# Patient Record
Sex: MALE | Race: WHITE | NOT HISPANIC OR LATINO
[De-identification: names, ages, dates, MRNs, and addresses within clinical notes are randomized per-mention and may not be internally consistent; named-entity substitution may affect disease eponyms.]

---

## 2017-02-21 ENCOUNTER — MEDICATION RENEWAL (OUTPATIENT)
Age: 27
End: 2017-02-21

## 2017-02-21 ENCOUNTER — RX RENEWAL (OUTPATIENT)
Age: 27
End: 2017-02-21

## 2017-03-10 ENCOUNTER — APPOINTMENT (OUTPATIENT)
Dept: INFECTIOUS DISEASE | Facility: CLINIC | Age: 27
End: 2017-03-10

## 2017-03-15 ENCOUNTER — LABORATORY RESULT (OUTPATIENT)
Age: 27
End: 2017-03-15

## 2017-03-15 ENCOUNTER — APPOINTMENT (OUTPATIENT)
Dept: INFECTIOUS DISEASE | Facility: CLINIC | Age: 27
End: 2017-03-15

## 2017-03-17 LAB
ALBUMIN SERPL ELPH-MCNC: 4.4 G/DL
ALP BLD-CCNC: 69 U/L
ALT SERPL-CCNC: 34 U/L
ANION GAP SERPL CALC-SCNC: 16 MMOL/L
AST SERPL-CCNC: 24 U/L
BASOPHILS # BLD AUTO: 0.04 K/UL
BASOPHILS NFR BLD AUTO: 0.5 %
BILIRUB SERPL-MCNC: 0.5 MG/DL
BUN SERPL-MCNC: 12 MG/DL
C TRACH RRNA SPEC QL NAA+PROBE: NORMAL
CALCIUM SERPL-MCNC: 9.6 MG/DL
CHLORIDE SERPL-SCNC: 100 MMOL/L
CO2 SERPL-SCNC: 25 MMOL/L
CREAT SERPL-MCNC: 1.13 MG/DL
EOSINOPHIL # BLD AUTO: 0.04 K/UL
EOSINOPHIL NFR BLD AUTO: 0.5 %
GLUCOSE SERPL-MCNC: 78 MG/DL
HCT VFR BLD CALC: 47.7 %
HGB BLD-MCNC: 15.7 G/DL
HIV1 RNA # SERPL NAA+PROBE: NOT DETECTED COPIES/ML
IMM GRANULOCYTES NFR BLD AUTO: 0.5 %
LYMPHOCYTES # BLD AUTO: 1.83 K/UL
LYMPHOCYTES NFR BLD AUTO: 21.6 %
MAN DIFF?: NORMAL
MCHC RBC-ENTMCNC: 27.8 PG
MCHC RBC-ENTMCNC: 32.9 GM/DL
MCV RBC AUTO: 84.4 FL
MONOCYTES # BLD AUTO: 1.07 K/UL
MONOCYTES NFR BLD AUTO: 12.6 %
N GONORRHOEA RRNA SPEC QL NAA+PROBE: NORMAL
NEUTROPHILS # BLD AUTO: 5.46 K/UL
NEUTROPHILS NFR BLD AUTO: 64.3 %
PLATELET # BLD AUTO: 239 K/UL
POTASSIUM SERPL-SCNC: 4.3 MMOL/L
PROT SERPL-MCNC: 7.7 G/DL
RBC # BLD: 5.65 M/UL
RBC # FLD: 13.2 %
SODIUM SERPL-SCNC: 141 MMOL/L
SOURCE AMPLIFICATION: NORMAL
T PALLIDUM AB SER QL IA: POSITIVE
VIRAL LOAD LOG: NOT DETECTED LG10COP/ML
WBC # FLD AUTO: 8.48 K/UL

## 2017-03-31 ENCOUNTER — APPOINTMENT (OUTPATIENT)
Dept: INFECTIOUS DISEASE | Facility: CLINIC | Age: 27
End: 2017-03-31

## 2017-03-31 VITALS
DIASTOLIC BLOOD PRESSURE: 76 MMHG | WEIGHT: 216 LBS | HEART RATE: 73 BPM | SYSTOLIC BLOOD PRESSURE: 134 MMHG | OXYGEN SATURATION: 97 % | HEIGHT: 71 IN | TEMPERATURE: 97 F | BODY MASS INDEX: 30.24 KG/M2

## 2017-03-31 RX ORDER — ERYTHROMYCIN AND BENZOYL PEROXIDE 3 %-5 %
5-3 KIT TOPICAL
Qty: 466 | Refills: 0 | Status: ACTIVE | COMMUNITY
Start: 2016-12-15

## 2017-04-06 LAB
C TRACH RRNA SPEC QL NAA+PROBE: NORMAL
C TRACH RRNA SPEC QL NAA+PROBE: NORMAL
HIV1 RNA # SERPL NAA+PROBE: NOT DETECTED COPIES/ML
HIV1+2 AB SPEC QL IA.RAPID: NONREACTIVE
N GONORRHOEA RRNA SPEC QL NAA+PROBE: NORMAL
N GONORRHOEA RRNA SPEC QL NAA+PROBE: NORMAL
RPR SER-TITR: NORMAL
SOURCE AMPLIFICATION: NORMAL
SOURCE AMPLIFICATION: NORMAL
VIRAL LOAD LOG: NOT DETECTED LG10COP/ML

## 2017-06-08 ENCOUNTER — MEDICATION RENEWAL (OUTPATIENT)
Age: 27
End: 2017-06-08

## 2017-06-08 ENCOUNTER — RX RENEWAL (OUTPATIENT)
Age: 27
End: 2017-06-08

## 2017-07-10 ENCOUNTER — APPOINTMENT (OUTPATIENT)
Dept: INFECTIOUS DISEASE | Facility: CLINIC | Age: 27
End: 2017-07-10

## 2017-07-26 ENCOUNTER — RX RENEWAL (OUTPATIENT)
Age: 27
End: 2017-07-26

## 2017-07-26 PROBLEM — Z00.00 ENCOUNTER FOR PREVENTIVE HEALTH EXAMINATION: Noted: 2017-07-26

## 2017-07-31 ENCOUNTER — LABORATORY RESULT (OUTPATIENT)
Age: 27
End: 2017-07-31

## 2017-07-31 ENCOUNTER — APPOINTMENT (OUTPATIENT)
Dept: INFECTIOUS DISEASE | Facility: CLINIC | Age: 27
End: 2017-07-31

## 2017-08-03 ENCOUNTER — APPOINTMENT (OUTPATIENT)
Dept: INFECTIOUS DISEASE | Facility: CLINIC | Age: 27
End: 2017-08-03

## 2017-08-08 LAB
ALBUMIN SERPL ELPH-MCNC: 4.8 G/DL
ALP BLD-CCNC: 83 U/L
ALT SERPL-CCNC: 40 U/L
ANION GAP SERPL CALC-SCNC: 14 MMOL/L
AST SERPL-CCNC: 22 U/L
BASOPHILS # BLD AUTO: 0.03 K/UL
BASOPHILS NFR BLD AUTO: 0.4 %
BILIRUB SERPL-MCNC: 0.5 MG/DL
BUN SERPL-MCNC: 11 MG/DL
C TRACH RRNA SPEC QL NAA+PROBE: NORMAL
C TRACH RRNA SPEC QL NAA+PROBE: NORMAL
CALCIUM SERPL-MCNC: 9.8 MG/DL
CHLORIDE SERPL-SCNC: 97 MMOL/L
CO2 SERPL-SCNC: 28 MMOL/L
CREAT SERPL-MCNC: 1.01 MG/DL
EOSINOPHIL # BLD AUTO: 0.05 K/UL
EOSINOPHIL NFR BLD AUTO: 0.6 %
GLUCOSE SERPL-MCNC: 94 MG/DL
HCT VFR BLD CALC: 46.4 %
HGB BLD-MCNC: 15.5 G/DL
HIV1+2 AB SPEC QL IA.RAPID: NONREACTIVE
IMM GRANULOCYTES NFR BLD AUTO: 0.2 %
LYMPHOCYTES # BLD AUTO: 1.6 K/UL
LYMPHOCYTES NFR BLD AUTO: 19.2 %
MAN DIFF?: NORMAL
MCHC RBC-ENTMCNC: 27.4 PG
MCHC RBC-ENTMCNC: 33.4 GM/DL
MCV RBC AUTO: 82.1 FL
MONOCYTES # BLD AUTO: 0.91 K/UL
MONOCYTES NFR BLD AUTO: 10.9 %
N GONORRHOEA RRNA SPEC QL NAA+PROBE: NORMAL
N GONORRHOEA RRNA SPEC QL NAA+PROBE: NORMAL
NEUTROPHILS # BLD AUTO: 5.71 K/UL
NEUTROPHILS NFR BLD AUTO: 68.7 %
PLATELET # BLD AUTO: 262 K/UL
POTASSIUM SERPL-SCNC: 5 MMOL/L
PROT SERPL-MCNC: 7.4 G/DL
RBC # BLD: 5.65 M/UL
RBC # FLD: 13.5 %
SODIUM SERPL-SCNC: 139 MMOL/L
SOURCE AMPLIFICATION: NORMAL
SOURCE AMPLIFICATION: NORMAL
T PALLIDUM AB SER QL IA: POSITIVE
WBC # FLD AUTO: 8.32 K/UL

## 2017-08-09 ENCOUNTER — APPOINTMENT (OUTPATIENT)
Dept: INFECTIOUS DISEASE | Facility: CLINIC | Age: 27
End: 2017-08-09
Payer: COMMERCIAL

## 2017-08-09 ENCOUNTER — APPOINTMENT (OUTPATIENT)
Dept: INFECTIOUS DISEASE | Facility: CLINIC | Age: 27
End: 2017-08-09

## 2017-08-09 VITALS
HEART RATE: 75 BPM | RESPIRATION RATE: 16 BRPM | WEIGHT: 215 LBS | SYSTOLIC BLOOD PRESSURE: 139 MMHG | TEMPERATURE: 97.7 F | OXYGEN SATURATION: 98 % | DIASTOLIC BLOOD PRESSURE: 78 MMHG | BODY MASS INDEX: 30.1 KG/M2 | HEIGHT: 71 IN

## 2017-08-09 PROCEDURE — 90651 9VHPV VACCINE 2/3 DOSE IM: CPT

## 2017-08-09 PROCEDURE — 99213 OFFICE O/P EST LOW 20 MIN: CPT | Mod: 25

## 2017-08-09 PROCEDURE — 90471 IMMUNIZATION ADMIN: CPT

## 2017-08-09 RX ORDER — FEXOFENADINE HYDROCHLORIDE 30 MG/1
30 TABLET, ORALLY DISINTEGRATING ORAL
Refills: 0 | Status: ACTIVE | COMMUNITY
Start: 2017-08-09

## 2017-08-09 RX ORDER — ASCORBIC ACID 1000 MG
1000 TABLET, EXTENDED RELEASE ORAL
Refills: 0 | Status: ACTIVE | COMMUNITY
Start: 2017-08-09

## 2017-08-10 ENCOUNTER — MED ADMIN CHARGE (OUTPATIENT)
Age: 27
End: 2017-08-10

## 2017-08-28 ENCOUNTER — RX RENEWAL (OUTPATIENT)
Age: 27
End: 2017-08-28

## 2017-10-04 ENCOUNTER — MEDICATION RENEWAL (OUTPATIENT)
Age: 27
End: 2017-10-04

## 2017-10-04 ENCOUNTER — RX RENEWAL (OUTPATIENT)
Age: 27
End: 2017-10-04

## 2017-10-16 ENCOUNTER — MEDICATION RENEWAL (OUTPATIENT)
Age: 27
End: 2017-10-16

## 2017-10-16 ENCOUNTER — MED ADMIN CHARGE (OUTPATIENT)
Age: 27
End: 2017-10-16

## 2017-10-16 ENCOUNTER — APPOINTMENT (OUTPATIENT)
Dept: INFECTIOUS DISEASE | Facility: CLINIC | Age: 27
End: 2017-10-16
Payer: COMMERCIAL

## 2017-10-16 PROCEDURE — 90651 9VHPV VACCINE 2/3 DOSE IM: CPT

## 2017-10-16 PROCEDURE — 90471 IMMUNIZATION ADMIN: CPT

## 2017-10-17 ENCOUNTER — MED ADMIN CHARGE (OUTPATIENT)
Age: 27
End: 2017-10-17

## 2017-10-27 ENCOUNTER — LABORATORY RESULT (OUTPATIENT)
Age: 27
End: 2017-10-27

## 2017-10-27 ENCOUNTER — APPOINTMENT (OUTPATIENT)
Dept: INFECTIOUS DISEASE | Facility: CLINIC | Age: 27
End: 2017-10-27
Payer: COMMERCIAL

## 2017-10-27 ENCOUNTER — RX RENEWAL (OUTPATIENT)
Age: 27
End: 2017-10-27

## 2017-10-27 VITALS
OXYGEN SATURATION: 98 % | HEIGHT: 71 IN | TEMPERATURE: 97 F | HEART RATE: 64 BPM | SYSTOLIC BLOOD PRESSURE: 134 MMHG | DIASTOLIC BLOOD PRESSURE: 77 MMHG | BODY MASS INDEX: 29.96 KG/M2 | WEIGHT: 214 LBS

## 2017-10-27 PROCEDURE — 99214 OFFICE O/P EST MOD 30 MIN: CPT

## 2017-10-27 RX ORDER — BIOTIN 10 MG
500 TABLET ORAL
Refills: 0 | Status: DISCONTINUED | COMMUNITY
Start: 2017-08-09 | End: 2017-10-27

## 2017-10-27 RX ORDER — ZINC 25 MG
25 TABLET ORAL
Refills: 0 | Status: ACTIVE | COMMUNITY
Start: 2017-10-27

## 2017-10-27 RX ORDER — HYDROCORTISONE 25 MG/G
2.5 OINTMENT TOPICAL
Qty: 28 | Refills: 0 | Status: ACTIVE | COMMUNITY
Start: 2017-10-19

## 2017-10-27 RX ORDER — MULTIVIT-MIN/IRON/FOLIC ACID/K 18-600-40
500 CAPSULE ORAL
Refills: 0 | Status: ACTIVE | COMMUNITY
Start: 2017-10-27

## 2017-10-30 ENCOUNTER — RESULT CHARGE (OUTPATIENT)
Age: 27
End: 2017-10-30

## 2017-10-30 ENCOUNTER — CHART COPY (OUTPATIENT)
Age: 27
End: 2017-10-30

## 2017-10-30 LAB
ALBUMIN SERPL ELPH-MCNC: 4.2 G/DL
ALP BLD-CCNC: 69 U/L
ALT SERPL-CCNC: 37 U/L
ANION GAP SERPL CALC-SCNC: 10 MMOL/L
APPEARANCE: CLEAR
AST SERPL-CCNC: 25 U/L
BACTERIA UR CULT: NORMAL
BACTERIA: NEGATIVE
BILIRUB SERPL-MCNC: 0.3 MG/DL
BILIRUBIN URINE: NEGATIVE
BLOOD URINE: NEGATIVE
BUN SERPL-MCNC: 10 MG/DL
C TRACH RRNA SPEC QL NAA+PROBE: NOT DETECTED
CALCIUM SERPL-MCNC: 9.6 MG/DL
CHLORIDE SERPL-SCNC: 101 MMOL/L
CO2 SERPL-SCNC: 27 MMOL/L
COLOR: ABNORMAL
CREAT SERPL-MCNC: 1.05 MG/DL
GLUCOSE QUALITATIVE U: NEGATIVE MG/DL
GLUCOSE SERPL-MCNC: 108 MG/DL
HAV IGG+IGM SER QL: NONREACTIVE
HBV CORE IGG+IGM SER QL: NONREACTIVE
HBV SURFACE AB SER QL: REACTIVE
HBV SURFACE AG SER QL: NONREACTIVE
HCV AB SER QL: NONREACTIVE
HCV S/CO RATIO: 0.13 S/CO
HIV1+2 AB SPEC QL IA.RAPID: NONREACTIVE
KETONES URINE: NEGATIVE
LEUKOCYTE ESTERASE URINE: ABNORMAL
MICROSCOPIC-UA: NORMAL
N GONORRHOEA RRNA SPEC QL NAA+PROBE: DETECTED
NITRITE URINE: POSITIVE
PH URINE: 6
POTASSIUM SERPL-SCNC: 5 MMOL/L
PROT SERPL-MCNC: 7.2 G/DL
PROTEIN URINE: NEGATIVE MG/DL
RED BLOOD CELLS URINE: 3 /HPF
SODIUM SERPL-SCNC: 138 MMOL/L
SOURCE AMPLIFICATION: NORMAL
SPECIFIC GRAVITY URINE: 1.01
SQUAMOUS EPITHELIAL CELLS: 0 /HPF
UROBILINOGEN URINE: 1 MG/DL
WHITE BLOOD CELLS URINE: 42 /HPF

## 2017-10-31 ENCOUNTER — APPOINTMENT (OUTPATIENT)
Dept: INFECTIOUS DISEASE | Facility: CLINIC | Age: 27
End: 2017-10-31
Payer: COMMERCIAL

## 2017-10-31 PROCEDURE — 96372 THER/PROPH/DIAG INJ SC/IM: CPT

## 2017-10-31 RX ORDER — CEFTRIAXONE 250 MG/1
250 INJECTION, POWDER, FOR SOLUTION INTRAMUSCULAR; INTRAVENOUS
Qty: 1 | Refills: 0 | Status: COMPLETED | OUTPATIENT
Start: 2017-10-30 | End: 2017-10-31

## 2017-11-01 LAB — T PALLIDUM AB SER QL IA: POSITIVE

## 2017-11-15 ENCOUNTER — RX RENEWAL (OUTPATIENT)
Age: 27
End: 2017-11-15

## 2017-11-15 ENCOUNTER — MEDICATION RENEWAL (OUTPATIENT)
Age: 27
End: 2017-11-15

## 2017-12-04 ENCOUNTER — MEDICATION RENEWAL (OUTPATIENT)
Age: 27
End: 2017-12-04

## 2018-01-29 ENCOUNTER — LABORATORY RESULT (OUTPATIENT)
Age: 28
End: 2018-01-29

## 2018-01-29 ENCOUNTER — APPOINTMENT (OUTPATIENT)
Dept: INFECTIOUS DISEASE | Facility: CLINIC | Age: 28
End: 2018-01-29
Payer: MEDICAID

## 2018-01-29 VITALS
HEART RATE: 60 BPM | BODY MASS INDEX: 31.36 KG/M2 | TEMPERATURE: 98 F | SYSTOLIC BLOOD PRESSURE: 133 MMHG | WEIGHT: 224 LBS | DIASTOLIC BLOOD PRESSURE: 80 MMHG | HEIGHT: 71 IN | OXYGEN SATURATION: 100 %

## 2018-01-29 DIAGNOSIS — A54.9 GONOCOCCAL INFECTION, UNSPECIFIED: ICD-10-CM

## 2018-01-29 PROCEDURE — 90651 9VHPV VACCINE 2/3 DOSE IM: CPT

## 2018-01-29 PROCEDURE — 99214 OFFICE O/P EST MOD 30 MIN: CPT | Mod: 25

## 2018-01-29 PROCEDURE — 90471 IMMUNIZATION ADMIN: CPT

## 2018-01-29 PROCEDURE — 90472 IMMUNIZATION ADMIN EACH ADD: CPT

## 2018-01-29 PROCEDURE — 90632 HEPA VACCINE ADULT IM: CPT

## 2018-01-29 RX ORDER — AZITHROMYCIN 500 MG/1
500 TABLET, FILM COATED ORAL
Qty: 2 | Refills: 0 | Status: DISCONTINUED | COMMUNITY
Start: 2017-10-30 | End: 2018-01-29

## 2018-01-29 RX ORDER — CRISABOROLE 20 MG/G
2 OINTMENT TOPICAL
Qty: 60 | Refills: 0 | Status: DISCONTINUED | COMMUNITY
Start: 2017-10-19 | End: 2018-01-29

## 2018-01-30 ENCOUNTER — MED ADMIN CHARGE (OUTPATIENT)
Age: 28
End: 2018-01-30

## 2018-01-30 PROBLEM — A54.9 GONORRHEA: Status: ACTIVE | Noted: 2017-10-30

## 2018-02-01 LAB
ALBUMIN SERPL ELPH-MCNC: 4.6 G/DL
ALP BLD-CCNC: 62 U/L
ALT SERPL-CCNC: 36 U/L
ANION GAP SERPL CALC-SCNC: 14 MMOL/L
AST SERPL-CCNC: 20 U/L
BASOPHILS # BLD AUTO: 0.04 K/UL
BASOPHILS NFR BLD AUTO: 0.6 %
BILIRUB SERPL-MCNC: 0.5 MG/DL
BUN SERPL-MCNC: 14 MG/DL
C TRACH RRNA SPEC QL NAA+PROBE: NOT DETECTED
C TRACH RRNA SPEC QL NAA+PROBE: NOT DETECTED
CALCIUM SERPL-MCNC: 9.6 MG/DL
CHLORIDE SERPL-SCNC: 103 MMOL/L
CMV IGG SERPL QL: 4.8 U/ML
CMV IGG SERPL-IMP: POSITIVE
CMV IGM SERPL QL: <8 AU/ML
CMV IGM SERPL QL: NEGATIVE
CO2 SERPL-SCNC: 24 MMOL/L
CREAT SERPL-MCNC: 0.89 MG/DL
EBV EA AB SER IA-ACNC: 11.1 U/ML
EBV EA AB TITR SER IF: POSITIVE
EBV EA IGG SER QL IA: 502 U/ML
EBV EA IGG SER-ACNC: POSITIVE
EBV EA IGM SER IA-ACNC: NEGATIVE
EBV PATRN SPEC IB-IMP: NORMAL
EBV VCA IGG SER IA-ACNC: 116 U/ML
EBV VCA IGM SER QL IA: <10 U/ML
EOSINOPHIL # BLD AUTO: 0.1 K/UL
EOSINOPHIL NFR BLD AUTO: 1.5 %
EPSTEIN-BARR VIRUS CAPSID ANTIGEN IGG: POSITIVE
GLUCOSE SERPL-MCNC: 96 MG/DL
HCT VFR BLD CALC: 47.1 %
HGB BLD-MCNC: 15.6 G/DL
HIV1+2 AB SPEC QL IA.RAPID: NONREACTIVE
HIVABINT: NORMAL
IMM GRANULOCYTES NFR BLD AUTO: 0.3 %
LYMPHOCYTES # BLD AUTO: 2.04 K/UL
LYMPHOCYTES NFR BLD AUTO: 30.5 %
MAN DIFF?: NORMAL
MCHC RBC-ENTMCNC: 28.2 PG
MCHC RBC-ENTMCNC: 33.1 GM/DL
MCV RBC AUTO: 85 FL
MONOCYTES # BLD AUTO: 0.78 K/UL
MONOCYTES NFR BLD AUTO: 11.7 %
N GONORRHOEA RRNA SPEC QL NAA+PROBE: NOT DETECTED
N GONORRHOEA RRNA SPEC QL NAA+PROBE: NOT DETECTED
NEUTROPHILS # BLD AUTO: 3.71 K/UL
NEUTROPHILS NFR BLD AUTO: 55.4 %
PLATELET # BLD AUTO: 226 K/UL
POTASSIUM SERPL-SCNC: 5 MMOL/L
PROT SERPL-MCNC: 7.2 G/DL
RBC # BLD: 5.54 M/UL
RBC # FLD: 13.1 %
SODIUM SERPL-SCNC: 141 MMOL/L
SOURCE AMPLIFICATION: NORMAL
SOURCE AMPLIFICATION: NORMAL
T PALLIDUM AB SER QL IA: POSITIVE
WBC # FLD AUTO: 6.69 K/UL

## 2018-03-26 ENCOUNTER — RX RENEWAL (OUTPATIENT)
Age: 28
End: 2018-03-26

## 2018-03-26 ENCOUNTER — MEDICATION RENEWAL (OUTPATIENT)
Age: 28
End: 2018-03-26

## 2018-05-25 ENCOUNTER — RX RENEWAL (OUTPATIENT)
Age: 28
End: 2018-05-25

## 2018-06-19 ENCOUNTER — RX RENEWAL (OUTPATIENT)
Age: 28
End: 2018-06-19

## 2018-09-13 ENCOUNTER — OUTPATIENT (OUTPATIENT)
Dept: OUTPATIENT SERVICES | Facility: HOSPITAL | Age: 28
LOS: 1 days | End: 2018-09-13
Payer: MEDICAID

## 2018-09-13 ENCOUNTER — APPOINTMENT (OUTPATIENT)
Dept: INFECTIOUS DISEASE | Facility: CLINIC | Age: 28
End: 2018-09-13
Payer: MEDICAID

## 2018-09-13 VITALS
RESPIRATION RATE: 16 BRPM | DIASTOLIC BLOOD PRESSURE: 68 MMHG | OXYGEN SATURATION: 98 % | WEIGHT: 233 LBS | HEART RATE: 81 BPM | TEMPERATURE: 97.1 F | SYSTOLIC BLOOD PRESSURE: 128 MMHG | BODY MASS INDEX: 32.5 KG/M2

## 2018-09-13 DIAGNOSIS — B97.89 OTHER VIRAL AGENTS AS THE CAUSE OF DISEASES CLASSIFIED ELSEWHERE: ICD-10-CM

## 2018-09-13 PROCEDURE — 99214 OFFICE O/P EST MOD 30 MIN: CPT

## 2018-09-13 PROCEDURE — 90632 HEPA VACCINE ADULT IM: CPT

## 2018-09-13 PROCEDURE — G0463: CPT | Mod: 25

## 2018-09-13 PROCEDURE — 90471 IMMUNIZATION ADMIN: CPT

## 2018-09-15 LAB
C TRACH RRNA SPEC QL NAA+PROBE: NOT DETECTED
HCV AB SER QL: NONREACTIVE
HCV S/CO RATIO: 0.14 S/CO
HIV1+2 AB SPEC QL IA.RAPID: NONREACTIVE
HSV 1+2 IGG SER IA-IMP: NEGATIVE
HSV 1+2 IGG SER IA-IMP: POSITIVE
HSV1 IGG SER QL: 29.6 INDEX
HSV2 IGG SER QL: 0.11 INDEX
N GONORRHOEA RRNA SPEC QL NAA+PROBE: NOT DETECTED
RPR SER-TITR: NORMAL
SOURCE AMPLIFICATION: NORMAL
SOURCE ANAL: NORMAL
SOURCE ORAL: NORMAL

## 2018-09-25 DIAGNOSIS — Z23 ENCOUNTER FOR IMMUNIZATION: ICD-10-CM

## 2018-09-25 DIAGNOSIS — Z11.3 ENCOUNTER FOR SCREENING FOR INFECTIONS WITH A PREDOMINANTLY SEXUAL MODE OF TRANSMISSION: ICD-10-CM

## 2018-09-25 DIAGNOSIS — Z20.820 CONTACT WITH AND (SUSPECTED) EXPOSURE TO VARICELLA: ICD-10-CM

## 2020-11-24 DIAGNOSIS — Z11.59 ENCOUNTER FOR SCREENING FOR OTHER VIRAL DISEASES: ICD-10-CM

## 2021-07-09 ENCOUNTER — OUTPATIENT (OUTPATIENT)
Dept: OUTPATIENT SERVICES | Facility: HOSPITAL | Age: 31
LOS: 1 days | End: 2021-07-09
Payer: MEDICAID

## 2021-07-09 ENCOUNTER — APPOINTMENT (OUTPATIENT)
Dept: INFECTIOUS DISEASE | Facility: CLINIC | Age: 31
End: 2021-07-09
Payer: MEDICAID

## 2021-07-09 ENCOUNTER — LABORATORY RESULT (OUTPATIENT)
Age: 31
End: 2021-07-09

## 2021-07-09 VITALS
HEART RATE: 77 BPM | HEIGHT: 71 IN | OXYGEN SATURATION: 96 % | WEIGHT: 270 LBS | SYSTOLIC BLOOD PRESSURE: 137 MMHG | TEMPERATURE: 98.9 F | BODY MASS INDEX: 37.8 KG/M2 | DIASTOLIC BLOOD PRESSURE: 83 MMHG

## 2021-07-09 DIAGNOSIS — E66.9 OBESITY, UNSPECIFIED: ICD-10-CM

## 2021-07-09 DIAGNOSIS — B97.89 OTHER VIRAL AGENTS AS THE CAUSE OF DISEASES CLASSIFIED ELSEWHERE: ICD-10-CM

## 2021-07-09 PROCEDURE — 99214 OFFICE O/P EST MOD 30 MIN: CPT

## 2021-07-09 PROCEDURE — 99204 OFFICE O/P NEW MOD 45 MIN: CPT

## 2021-07-09 PROCEDURE — G0463: CPT

## 2021-07-09 RX ORDER — FLUOCINONIDE 0.05 MG/G
0.05 OINTMENT TOPICAL
Qty: 60 | Refills: 0 | Status: DISCONTINUED | COMMUNITY
Start: 2016-12-15 | End: 2021-07-09

## 2021-07-09 RX ORDER — DEXTROAMPHETAMINE SACCHARATE, AMPHETAMINE ASPARTATE, DEXTROAMPHETAMINE SULFATE AND AMPHETAMINE SULFATE 7.5; 7.5; 7.5; 7.5 MG/1; MG/1; MG/1; MG/1
30 TABLET ORAL
Qty: 30 | Refills: 0 | Status: DISCONTINUED | COMMUNITY
Start: 2017-10-05 | End: 2021-07-09

## 2021-07-09 NOTE — HISTORY OF PRESENT ILLNESS
[FreeTextEntry1] : "Going well, other than gaining weight\par \par Mr Pena was previously seen in this office on 9/13/18. He was taking Truvada for PREP from  July, 2015 through February 2018. He ended prep when in a monogamous relationship which has ended. He has had 2 unprotected sexual encounters - the most recent about 1 week ago- and wishes to resume PREP.\par \par The COVID pandemic has been hard: he lost both grandparents who raised him early in covid, lost a job and ended a relationship. He gained 37# since 9/13/18 and currently weights 270#  BMI = 37.66.  He is doing better, in therapy, getting back on track and has begun to eat healthier, be more active and getting psychotherapy.\par \par He had COVID Vaccination x2 MODERNA completed around March 2021.\par \par He has a history of vitamin D deficiency. \par The patient had a history of syphilis. On April 15, 2014 his RPR was 1-64. The patient has a high risk allergy to penicillin. The patient was treated with doxycycline beginning April 17, 2014. Followup syphilis testing on July 8, 2014 revealed a painful decrease in the RPR titer to 1-4. Repeat RPR on September 5, 2014 was 1-2. HIs last RPR titer in this office was <1:1.\par The patient is generally in good health. At age 5 he required surgery for left mastoiditis.\par The patient has a childhood history of penicillin allergy. The patient was rechallenged with penicillin approximately 2012 for strep throat and developed rapid onset of an urticarial rash one day after exposure. \par \par He had developed hidradenitis suppurtiva in axillae and groin - was on prolonged Doxycline for past year. He traveled to [place] about 3 weeks ago and developed traveler's diarrhea. Just completed CIPRO x 3 days and diarrhea resolved.\par He notes sensitive stomach, some bloating and reflux. He has mild discomfort at uretheral meatus. No dysuria, fever, rash, rectal pain, anal discharge. No dental pain - has not seen dentist since beginning of pandemic

## 2021-07-09 NOTE — CONSULT LETTER
[Dear  ___] : Dear  [unfilled], [Consult Letter:] : I had the pleasure of evaluating your patient, [unfilled]. [Please see my note below.] : Please see my note below. [Consult Closing:] : Thank you very much for allowing me to participate in the care of this patient.  If you have any questions, please do not hesitate to contact me. [Sincerely,] : Sincerely, [FreeTextEntry2] : Dr Wily Barkley\par [address]\[place] [address]  [FreeTextEntry3] : Matthew Goss MD, FACP, MELISA, AAHIVM\par Infectious Diseases\par [facility]

## 2021-07-09 NOTE — ASSESSMENT
[FreeTextEntry1] : PREP\par - HIV screen and STD check\par - will resume Truvada. Mr Pena is concerned about Descovey associated risk of weight gain)\par -labs\par Mr Pena's efforts to lose weight and improve his health were encouraged and supported\par \par labs today [Treatment Education] : treatment education [Treatment Adherence] : treatment adherence [Rx Dose / Side Effects] : Rx dose/side effects [Nutritional / Food Issues] : nutritional/food issues [Medical Care Issues] : medical care issues

## 2021-07-13 DIAGNOSIS — E66.9 OBESITY, UNSPECIFIED: ICD-10-CM

## 2021-07-13 DIAGNOSIS — L73.2 HIDRADENITIS SUPPURATIVA: ICD-10-CM

## 2021-07-13 DIAGNOSIS — Z11.3 ENCOUNTER FOR SCREENING FOR INFECTIONS WITH A PREDOMINANTLY SEXUAL MODE OF TRANSMISSION: ICD-10-CM

## 2021-07-13 LAB
ALBUMIN SERPL ELPH-MCNC: 4.5 G/DL
ALP BLD-CCNC: 80 U/L
ALT SERPL-CCNC: 63 U/L
ANION GAP SERPL CALC-SCNC: 12 MMOL/L
AST SERPL-CCNC: 24 U/L
BASOPHILS # BLD AUTO: 0.07 K/UL
BASOPHILS NFR BLD AUTO: 1 %
BILIRUB SERPL-MCNC: 0.3 MG/DL
BUN SERPL-MCNC: 11 MG/DL
C TRACH RRNA SPEC QL NAA+PROBE: NOT DETECTED
CALCIUM SERPL-MCNC: 9.6 MG/DL
CHLORIDE SERPL-SCNC: 101 MMOL/L
CHOLEST SERPL-MCNC: 156 MG/DL
CK SERPL-CCNC: 100 U/L
CO2 SERPL-SCNC: 25 MMOL/L
CREAT SERPL-MCNC: 0.97 MG/DL
EOSINOPHIL # BLD AUTO: 0.27 K/UL
EOSINOPHIL NFR BLD AUTO: 3.8 %
ESTIMATED AVERAGE GLUCOSE: 117 MG/DL
GLUCOSE SERPL-MCNC: 104 MG/DL
HBA1C MFR BLD HPLC: 5.7 %
HBV CORE IGG+IGM SER QL: NONREACTIVE
HBV SURFACE AB SER QL: REACTIVE
HBV SURFACE AG SER QL: NONREACTIVE
HCT VFR BLD CALC: 49 %
HCV AB SER QL: NONREACTIVE
HCV S/CO RATIO: 0.17 S/CO
HDLC SERPL-MCNC: 35 MG/DL
HGB BLD-MCNC: 15.8 G/DL
HIV1 RNA # SERPL NAA+PROBE: NORMAL
HIV1 RNA # SERPL NAA+PROBE: NORMAL COPIES/ML
HIV1+2 AB SPEC QL IA.RAPID: NONREACTIVE
HSV 1+2 IGG SER IA-IMP: NEGATIVE
HSV 1+2 IGG SER IA-IMP: POSITIVE
HSV1 IGG SER QL: 22.8 INDEX
HSV1 IGM SER QL: NORMAL TITER
HSV2 AB FLD-ACNC: NORMAL TITER
HSV2 IGG SER QL: 0.17 INDEX
IMM GRANULOCYTES NFR BLD AUTO: 0.6 %
LDLC SERPL CALC-MCNC: 63 MG/DL
LYMPHOCYTES # BLD AUTO: 2.21 K/UL
LYMPHOCYTES NFR BLD AUTO: 31 %
MAN DIFF?: NORMAL
MCHC RBC-ENTMCNC: 26.8 PG
MCHC RBC-ENTMCNC: 32.2 GM/DL
MCV RBC AUTO: 83.1 FL
MONOCYTES # BLD AUTO: 0.64 K/UL
MONOCYTES NFR BLD AUTO: 9 %
N GONORRHOEA RRNA SPEC QL NAA+PROBE: NOT DETECTED
NEUTROPHILS # BLD AUTO: 3.9 K/UL
NEUTROPHILS NFR BLD AUTO: 54.6 %
NONHDLC SERPL-MCNC: 121 MG/DL
PLATELET # BLD AUTO: 335 K/UL
POTASSIUM SERPL-SCNC: 4.7 MMOL/L
PROT SERPL-MCNC: 7.1 G/DL
RBC # BLD: 5.9 M/UL
RBC # FLD: 13.1 %
SODIUM SERPL-SCNC: 138 MMOL/L
SOURCE AMPLIFICATION: NORMAL
SOURCE ANAL: NORMAL
SOURCE ORAL: NORMAL
T PALLIDUM AB SER QL IA: POSITIVE
TRIGL SERPL-MCNC: 287 MG/DL
VIRAL LOAD INTERP: NORMAL
VIRAL LOAD LOG: NORMAL LG COP/ML
WBC # FLD AUTO: 7.13 K/UL

## 2021-10-18 ENCOUNTER — LABORATORY RESULT (OUTPATIENT)
Age: 31
End: 2021-10-18

## 2021-10-18 ENCOUNTER — OUTPATIENT (OUTPATIENT)
Dept: OUTPATIENT SERVICES | Facility: HOSPITAL | Age: 31
LOS: 1 days | End: 2021-10-18
Payer: MEDICAID

## 2021-10-18 ENCOUNTER — APPOINTMENT (OUTPATIENT)
Dept: INFECTIOUS DISEASE | Facility: CLINIC | Age: 31
End: 2021-10-18
Payer: MEDICAID

## 2021-10-18 VITALS
TEMPERATURE: 97.7 F | DIASTOLIC BLOOD PRESSURE: 88 MMHG | BODY MASS INDEX: 37.38 KG/M2 | WEIGHT: 267 LBS | HEART RATE: 92 BPM | SYSTOLIC BLOOD PRESSURE: 138 MMHG | OXYGEN SATURATION: 96 % | HEIGHT: 71 IN | RESPIRATION RATE: 16 BRPM

## 2021-10-18 PROCEDURE — 99213 OFFICE O/P EST LOW 20 MIN: CPT

## 2021-10-18 PROCEDURE — G0463: CPT

## 2021-10-18 RX ORDER — CIPROFLOXACIN HYDROCHLORIDE 500 MG/1
500 TABLET, FILM COATED ORAL
Qty: 6 | Refills: 0 | Status: DISCONTINUED | COMMUNITY
Start: 2021-07-03

## 2021-10-18 NOTE — ASSESSMENT
[FreeTextEntry1] : Doing well\par on PREP with Truvada = prescribed 7/9/21, well tolerated\par sexually active\par obesity   BMI = 37.24 - weight loss encouraging\par lifestyle healthier - quite smoking early 9/21 - beginning exercise\par \par labs\par std screen\par \par decllines flu shot [Medical Care Issues] : medical care issues

## 2021-10-18 NOTE — PHYSICAL EXAM
[General Appearance - Alert] : alert [General Appearance - In No Acute Distress] : in no acute distress [Sclera] : the sclera and conjunctiva were normal [PERRL With Normal Accommodation] : pupils were equal in size, round, reactive to light [Extraocular Movements] : extraocular movements were intact [Outer Ear] : the ears and nose were normal in appearance [Oropharynx] : the oropharynx was normal with no thrush [Neck Appearance] : the appearance of the neck was normal [Neck Cervical Mass (___cm)] : no neck mass was observed [Jugular Venous Distention Increased] : there was no jugular-venous distention [Thyroid Diffuse Enlargement] : the thyroid was not enlarged [Auscultation Breath Sounds / Voice Sounds] : lungs were clear to auscultation bilaterally [Heart Rate And Rhythm] : heart rate was normal and rhythm regular [Heart Sounds] : normal S1 and S2 [Heart Sounds Gallop] : no gallops [Murmurs] : no murmurs [Heart Sounds Pericardial Friction Rub] : no pericardial rub [Bowel Sounds] : normal bowel sounds [Abdomen Soft] : soft [Abdomen Tenderness] : non-tender [Abdomen Mass (___ Cm)] : no abdominal mass palpated [Costovertebral Angle Tenderness] : no CVA tenderness [No Palpable Adenopathy] : no palpable adenopathy [Nail Clubbing] : no clubbing  or cyanosis of the fingernails [Musculoskeletal - Swelling] : no joint swelling [Motor Tone] : muscle strength and tone were normal [Skin Color & Pigmentation] : normal skin color and pigmentation [] : no rash [Oriented To Time, Place, And Person] : oriented to person, place, and time [Affect] : the affect was normal
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
## 2021-10-18 NOTE — CONSULT LETTER
[Dear  ___] : Dear  [unfilled], [Consult Letter:] : I had the pleasure of evaluating your patient, [unfilled]. [Please see my note below.] : Please see my note below. [Consult Closing:] : Thank you very much for allowing me to participate in the care of this patient.  If you have any questions, please do not hesitate to contact me. [Sincerely,] : Sincerely, [FreeTextEntry2] : Dr Wily Barkley\par [address]\[place] [address] \par  [FreeTextEntry3] : Matthew Goss MD, FACP, MELISA, AAHIVM\par Infectious Diseases\par [facility]

## 2021-10-19 LAB
ALBUMIN SERPL ELPH-MCNC: 4.5 G/DL
ALP BLD-CCNC: 94 U/L
ALT SERPL-CCNC: 55 U/L
ANION GAP SERPL CALC-SCNC: 13 MMOL/L
AST SERPL-CCNC: 25 U/L
BASOPHILS # BLD AUTO: 0.09 K/UL
BASOPHILS NFR BLD AUTO: 0.9 %
BILIRUB SERPL-MCNC: 0.6 MG/DL
BUN SERPL-MCNC: 9 MG/DL
C TRACH RRNA SPEC QL NAA+PROBE: NOT DETECTED
CALCIUM SERPL-MCNC: 9.7 MG/DL
CHLORIDE SERPL-SCNC: 105 MMOL/L
CO2 SERPL-SCNC: 23 MMOL/L
CREAT SERPL-MCNC: 1.14 MG/DL
EOSINOPHIL # BLD AUTO: 0.17 K/UL
EOSINOPHIL NFR BLD AUTO: 1.6 %
ESTIMATED AVERAGE GLUCOSE: 114 MG/DL
GLUCOSE SERPL-MCNC: 105 MG/DL
HBA1C MFR BLD HPLC: 5.6 %
HCT VFR BLD CALC: 48.8 %
HGB BLD-MCNC: 16.2 G/DL
HIV1+2 AB SPEC QL IA.RAPID: NONREACTIVE
IMM GRANULOCYTES NFR BLD AUTO: 0.5 %
LYMPHOCYTES # BLD AUTO: 2.42 K/UL
LYMPHOCYTES NFR BLD AUTO: 22.9 %
MAN DIFF?: NORMAL
MCHC RBC-ENTMCNC: 27.5 PG
MCHC RBC-ENTMCNC: 33.2 GM/DL
MCV RBC AUTO: 82.9 FL
MONOCYTES # BLD AUTO: 0.81 K/UL
MONOCYTES NFR BLD AUTO: 7.7 %
N GONORRHOEA RRNA SPEC QL NAA+PROBE: NOT DETECTED
NEUTROPHILS # BLD AUTO: 7.03 K/UL
NEUTROPHILS NFR BLD AUTO: 66.4 %
PLATELET # BLD AUTO: 324 K/UL
POTASSIUM SERPL-SCNC: 4.4 MMOL/L
PROT SERPL-MCNC: 7.4 G/DL
RBC # BLD: 5.89 M/UL
RBC # FLD: 13.2 %
SODIUM SERPL-SCNC: 140 MMOL/L
SOURCE AMPLIFICATION: NORMAL
SOURCE ANAL: NORMAL
SOURCE ORAL: NORMAL
WBC # FLD AUTO: 10.57 K/UL

## 2021-10-20 LAB — T PALLIDUM AB SER QL IA: POSITIVE

## 2021-10-25 LAB
HSV1 IGM SER QL: NEGATIVE
HSV2 AB FLD-ACNC: NEGATIVE

## 2021-10-26 DIAGNOSIS — Z11.3 ENCOUNTER FOR SCREENING FOR INFECTIONS WITH A PREDOMINANTLY SEXUAL MODE OF TRANSMISSION: ICD-10-CM

## 2021-10-26 DIAGNOSIS — Z87.891 PERSONAL HISTORY OF NICOTINE DEPENDENCE: ICD-10-CM

## 2021-10-26 DIAGNOSIS — B97.89 OTHER VIRAL AGENTS AS THE CAUSE OF DISEASES CLASSIFIED ELSEWHERE: ICD-10-CM

## 2021-11-30 ENCOUNTER — RX RENEWAL (OUTPATIENT)
Age: 31
End: 2021-11-30

## 2022-01-25 ENCOUNTER — RX RENEWAL (OUTPATIENT)
Age: 32
End: 2022-01-25

## 2022-01-26 ENCOUNTER — APPOINTMENT (OUTPATIENT)
Dept: INFECTIOUS DISEASE | Facility: CLINIC | Age: 32
End: 2022-01-26
Payer: MEDICAID

## 2022-01-26 ENCOUNTER — LABORATORY RESULT (OUTPATIENT)
Age: 32
End: 2022-01-26

## 2022-01-26 ENCOUNTER — OUTPATIENT (OUTPATIENT)
Dept: OUTPATIENT SERVICES | Facility: HOSPITAL | Age: 32
LOS: 1 days | End: 2022-01-26
Payer: MEDICAID

## 2022-01-26 VITALS
SYSTOLIC BLOOD PRESSURE: 139 MMHG | WEIGHT: 255 LBS | DIASTOLIC BLOOD PRESSURE: 78 MMHG | TEMPERATURE: 97.1 F | HEART RATE: 91 BPM | HEIGHT: 71 IN | BODY MASS INDEX: 35.7 KG/M2 | OXYGEN SATURATION: 97 %

## 2022-01-26 DIAGNOSIS — Z87.891 PERSONAL HISTORY OF NICOTINE DEPENDENCE: ICD-10-CM

## 2022-01-26 PROCEDURE — G0463: CPT

## 2022-01-26 PROCEDURE — 99213 OFFICE O/P EST LOW 20 MIN: CPT

## 2022-01-26 RX ORDER — DOXYCYCLINE 100 MG/1
100 CAPSULE ORAL
Qty: 60 | Refills: 0 | Status: DISCONTINUED | COMMUNITY
Start: 2021-06-30 | End: 2022-01-26

## 2022-01-26 RX ORDER — OXYMETAZOLINE HYDROCHLORIDE 1 G/100G
1 CREAM TOPICAL
Qty: 30 | Refills: 0 | Status: DISCONTINUED | COMMUNITY
Start: 2017-10-19 | End: 2022-01-26

## 2022-01-26 NOTE — CONSULT LETTER
[Dear  ___] : Dear  [unfilled], [Consult Letter:] : I had the pleasure of evaluating your patient, [unfilled]. [Please see my note below.] : Please see my note below. [Consult Closing:] : Thank you very much for allowing me to participate in the care of this patient.  If you have any questions, please do not hesitate to contact me. [Sincerely,] : Sincerely, [FreeTextEntry2] : Dr Wily Barkley\par [address]\[place] [address]  [FreeTextEntry3] : Matthew Goss MD, FACP, MELISA, AAHIVM\par Infectious Diseases\par [facility]

## 2022-01-26 NOTE — ASSESSMENT
[Treatment Education] : treatment education [Risk Reduction] : risk reduction [Medical Care Issues] : medical care issues [FreeTextEntry1] : Preventive health: doing well with PREP on Truvada\par obesity  - congratulated on impressive weight loss\par encouraged to get COVID Moderna booster\par \par labs\par STD check\par NP swab PCR COVID done as patient request

## 2022-01-26 NOTE — HISTORY OF PRESENT ILLNESS
[FreeTextEntry1] : Sadly, Mr Pena's grandmother  this am.\par \par Initiated PREP - Pre Exoposure HIV Prophylaxis with TRUVADA - prescribed 21 \par No medication adverse effects\par \par His health is good.\par Begun diet. Finds that helathy eating has improved his IBS symptoms\par He lost 12# since 10/18/21  current wieght = 255#  BMI = 35.57\par He remains off cigarette smoking since \par COVID Vaccination x2 MODERNA: 3/23/21, 21  - had not had booster.\par \par He is sexually active with men, Tops, only occasionally uses condoms.\par No dysuria, penile discharge, new rash, sore throat, fever, malaise\par \par Labs on 10/18/21 were all acceptable, Neg HIV, Neg STD screen\par \par

## 2022-01-28 LAB
ALBUMIN SERPL ELPH-MCNC: 5.1 G/DL
ALP BLD-CCNC: 88 U/L
ALT SERPL-CCNC: 53 U/L
ANION GAP SERPL CALC-SCNC: 14 MMOL/L
AST SERPL-CCNC: 25 U/L
BASOPHILS # BLD AUTO: 0.08 K/UL
BASOPHILS NFR BLD AUTO: 0.8 %
BILIRUB SERPL-MCNC: 0.6 MG/DL
BUN SERPL-MCNC: 11 MG/DL
C TRACH RRNA SPEC QL NAA+PROBE: NOT DETECTED
CALCIUM SERPL-MCNC: 10.5 MG/DL
CHLORIDE SERPL-SCNC: 103 MMOL/L
CO2 SERPL-SCNC: 25 MMOL/L
CREAT SERPL-MCNC: 1.07 MG/DL
EOSINOPHIL # BLD AUTO: 0.13 K/UL
EOSINOPHIL NFR BLD AUTO: 1.3 %
ESTIMATED AVERAGE GLUCOSE: 105 MG/DL
GLUCOSE SERPL-MCNC: 92 MG/DL
HBA1C MFR BLD HPLC: 5.3 %
HCT VFR BLD CALC: 51 %
HGB BLD-MCNC: 16.5 G/DL
HIV1+2 AB SPEC QL IA.RAPID: NONREACTIVE
HSV 1+2 IGG SER IA-IMP: NEGATIVE
HSV 1+2 IGG SER IA-IMP: POSITIVE
HSV1 IGG SER QL: 34.4 INDEX
HSV2 IGG SER QL: 0.59 INDEX
IMM GRANULOCYTES NFR BLD AUTO: 0.4 %
LYMPHOCYTES # BLD AUTO: 1.97 K/UL
LYMPHOCYTES NFR BLD AUTO: 20.2 %
MAN DIFF?: NORMAL
MCHC RBC-ENTMCNC: 27.9 PG
MCHC RBC-ENTMCNC: 32.4 GM/DL
MCV RBC AUTO: 86.3 FL
MONOCYTES # BLD AUTO: 0.69 K/UL
MONOCYTES NFR BLD AUTO: 7.1 %
N GONORRHOEA RRNA SPEC QL NAA+PROBE: NOT DETECTED
NEUTROPHILS # BLD AUTO: 6.84 K/UL
NEUTROPHILS NFR BLD AUTO: 70.2 %
PLATELET # BLD AUTO: 355 K/UL
POTASSIUM SERPL-SCNC: 4.8 MMOL/L
PROT SERPL-MCNC: 7.8 G/DL
RBC # BLD: 5.91 M/UL
RBC # FLD: 13.1 %
SARS-COV-2 N GENE NPH QL NAA+PROBE: NOT DETECTED
SODIUM SERPL-SCNC: 142 MMOL/L
SOURCE AMPLIFICATION: NORMAL
SOURCE ANAL: NORMAL
SOURCE ORAL: NORMAL
WBC # FLD AUTO: 9.75 K/UL

## 2022-01-31 LAB — T PALLIDUM AB SER QL IA: POSITIVE

## 2022-02-01 DIAGNOSIS — B97.89 OTHER VIRAL AGENTS AS THE CAUSE OF DISEASES CLASSIFIED ELSEWHERE: ICD-10-CM

## 2022-02-01 DIAGNOSIS — Z11.3 ENCOUNTER FOR SCREENING FOR INFECTIONS WITH A PREDOMINANTLY SEXUAL MODE OF TRANSMISSION: ICD-10-CM

## 2022-02-22 ENCOUNTER — NON-APPOINTMENT (OUTPATIENT)
Age: 32
End: 2022-02-22

## 2022-04-04 ENCOUNTER — NON-APPOINTMENT (OUTPATIENT)
Age: 32
End: 2022-04-04

## 2022-04-11 ENCOUNTER — OUTPATIENT (OUTPATIENT)
Dept: OUTPATIENT SERVICES | Facility: HOSPITAL | Age: 32
LOS: 1 days | End: 2022-04-11
Payer: MEDICAID

## 2022-04-11 ENCOUNTER — APPOINTMENT (OUTPATIENT)
Dept: INFECTIOUS DISEASE | Facility: CLINIC | Age: 32
End: 2022-04-11
Payer: MEDICAID

## 2022-04-11 VITALS
HEART RATE: 56 BPM | WEIGHT: 225 LBS | BODY MASS INDEX: 31.5 KG/M2 | TEMPERATURE: 97.7 F | DIASTOLIC BLOOD PRESSURE: 82 MMHG | RESPIRATION RATE: 16 BRPM | OXYGEN SATURATION: 98 % | HEIGHT: 71 IN | SYSTOLIC BLOOD PRESSURE: 131 MMHG

## 2022-04-11 DIAGNOSIS — E66.9 OBESITY, UNSPECIFIED: ICD-10-CM

## 2022-04-11 DIAGNOSIS — B97.89 OTHER VIRAL AGENTS AS THE CAUSE OF DISEASES CLASSIFIED ELSEWHERE: ICD-10-CM

## 2022-04-11 DIAGNOSIS — L73.2 HIDRADENITIS SUPPURATIVA: ICD-10-CM

## 2022-04-11 PROBLEM — Z11.59 SCREENING FOR VIRAL DISEASE: Status: ACTIVE | Noted: 2020-11-24

## 2022-04-11 PROCEDURE — 99213 OFFICE O/P EST LOW 20 MIN: CPT

## 2022-04-11 PROCEDURE — G0463: CPT

## 2022-04-11 NOTE — HISTORY OF PRESENT ILLNESS
[FreeTextEntry1] : Doing well\par Feels great\par has not resumed smoking  --- quite 8/21\par continues to lose weight  lost 30# since 1/26/22\par current weight = 225#  BMI = 31/38\par \par Initiated PREP - Pre Exoposure HIV Prophylaxis with TRUVADA - prescribed 7/9/21 \par No medication adverse effects\par \par COVID Vaccination x2 MODERNA: 3/23/21, 4/22/21 - had not had booster.\par \par He is sexually active with men, Tops, only occasionally uses condoms.\par No dysuria, penile discharge, new rash, sore throat, fever, malaise\par \par He has hidradenitis suppurtiva in axilla and groin - not too bad at present.\par \par labs on 1/2622 were all acceptable H/H = 16.5/51; Creat = 1.07, HgbA1C = 5.3%; Nl lft's HIV neg, PRP neg, Chlamydia/GC neg at all 3 sites.\par \par \par  [Sexually Active] : The patient is sexually active [Condom Use - Some Encounters] : for some encounters [Men] : with men

## 2022-04-11 NOTE — CONSULT LETTER
[Dear  ___] : Dear  [unfilled], [Consult Letter:] : I had the pleasure of evaluating your patient, [unfilled]. [Please see my note below.] : Please see my note below. [Consult Closing:] : Thank you very much for allowing me to participate in the care of this patient.  If you have any questions, please do not hesitate to contact me. [Sincerely,] : Sincerely, [FreeTextEntry2] : Dr Wily Barkley\par [address]\[place] [address]  [FreeTextEntry3] : Matthew Goss MD, FACP, MELISA, AAHIVM\par Infectious Diseases\par [facility]

## 2022-04-11 NOTE — PHYSICAL EXAM
[General Appearance - Alert] : alert [General Appearance - In No Acute Distress] : in no acute distress [Sclera] : the sclera and conjunctiva were normal [PERRL With Normal Accommodation] : pupils were equal in size, round, reactive to light [Extraocular Movements] : extraocular movements were intact [Outer Ear] : the ears and nose were normal in appearance [Oropharynx] : the oropharynx was normal with no thrush [Auscultation Breath Sounds / Voice Sounds] : lungs were clear to auscultation bilaterally [Heart Rate And Rhythm] : heart rate was normal and rhythm regular [Heart Sounds Gallop] : no gallops [Heart Sounds] : normal S1 and S2 [Murmurs] : no murmurs [Heart Sounds Pericardial Friction Rub] : no pericardial rub [Edema] : there was no peripheral edema [No Palpable Adenopathy] : no palpable adenopathy [Musculoskeletal - Swelling] : no joint swelling [Nail Clubbing] : no clubbing  or cyanosis of the fingernails [Motor Tone] : muscle strength and tone were normal [Skin Color & Pigmentation] : normal skin color and pigmentation [] : no rash [No Focal Deficits] : no focal deficits [Oriented To Time, Place, And Person] : oriented to person, place, and time [Affect] : the affect was normal

## 2022-04-11 NOTE — ASSESSMENT
[FreeTextEntry1] : Doing well\par adherent and tolerating PREP with Truvada - once daily\par His lifestyle is much healthier - remains off cigarettes, successful weight loss.\par \par The availability of an injectable for PREP was discussed, CAB LA - Cabotegravir- can be given every 2 months as an IM injection in gluteus with good efficacy.  Mr Pena is doing well with Truvada and finds the requirement to come in person to this office every 2 months too onerous.\par \par labs today [Medical Care Issues] : medical care issues

## 2022-04-12 LAB
ALBUMIN SERPL ELPH-MCNC: 5.1 G/DL
ALP BLD-CCNC: 96 U/L
ALT SERPL-CCNC: 36 U/L
ANION GAP SERPL CALC-SCNC: 13 MMOL/L
AST SERPL-CCNC: 21 U/L
BASOPHILS # BLD AUTO: 0.07 K/UL
BASOPHILS NFR BLD AUTO: 0.8 %
BILIRUB SERPL-MCNC: 0.8 MG/DL
BUN SERPL-MCNC: 12 MG/DL
C TRACH RRNA SPEC QL NAA+PROBE: NOT DETECTED
C TRACH RRNA SPEC QL NAA+PROBE: NOT DETECTED
CALCIUM SERPL-MCNC: 10.1 MG/DL
CHLORIDE SERPL-SCNC: 101 MMOL/L
CHOLEST SERPL-MCNC: 129 MG/DL
CO2 SERPL-SCNC: 25 MMOL/L
CREAT SERPL-MCNC: 0.99 MG/DL
EGFR: 105 ML/MIN/1.73M2
EOSINOPHIL # BLD AUTO: 0.11 K/UL
EOSINOPHIL NFR BLD AUTO: 1.3 %
ESTIMATED AVERAGE GLUCOSE: 114 MG/DL
GLUCOSE SERPL-MCNC: 92 MG/DL
HBA1C MFR BLD HPLC: 5.6 %
HCT VFR BLD CALC: 52.9 %
HDLC SERPL-MCNC: 33 MG/DL
HGB BLD-MCNC: 16.7 G/DL
HIV1+2 AB SPEC QL IA.RAPID: NONREACTIVE
HSV 1+2 IGG SER IA-IMP: NEGATIVE
HSV 1+2 IGG SER IA-IMP: POSITIVE
HSV1 IGG SER QL: 32.5 INDEX
HSV2 IGG SER QL: 0.68 INDEX
IMM GRANULOCYTES NFR BLD AUTO: 0.2 %
LDLC SERPL CALC-MCNC: 84 MG/DL
LYMPHOCYTES # BLD AUTO: 2.73 K/UL
LYMPHOCYTES NFR BLD AUTO: 32.4 %
MAN DIFF?: NORMAL
MCHC RBC-ENTMCNC: 27.5 PG
MCHC RBC-ENTMCNC: 31.6 GM/DL
MCV RBC AUTO: 87 FL
MONOCYTES # BLD AUTO: 0.71 K/UL
MONOCYTES NFR BLD AUTO: 8.4 %
N GONORRHOEA RRNA SPEC QL NAA+PROBE: NOT DETECTED
N GONORRHOEA RRNA SPEC QL NAA+PROBE: NOT DETECTED
NEUTROPHILS # BLD AUTO: 4.78 K/UL
NEUTROPHILS NFR BLD AUTO: 56.9 %
NONHDLC SERPL-MCNC: 96 MG/DL
PLATELET # BLD AUTO: 324 K/UL
POTASSIUM SERPL-SCNC: 4.7 MMOL/L
PROT SERPL-MCNC: 7.8 G/DL
RBC # BLD: 6.08 M/UL
RBC # FLD: 12.9 %
RPR SER-TITR: NORMAL
SODIUM SERPL-SCNC: 140 MMOL/L
SOURCE AMPLIFICATION: NORMAL
SOURCE ANAL: NORMAL
TRIGL SERPL-MCNC: 63 MG/DL
WBC # FLD AUTO: 8.42 K/UL

## 2022-04-13 LAB
C TRACH RRNA SPEC QL NAA+PROBE: NOT DETECTED
N GONORRHOEA RRNA SPEC QL NAA+PROBE: NOT DETECTED
SOURCE ORAL: NORMAL

## 2022-04-14 DIAGNOSIS — L73.2 HIDRADENITIS SUPPURATIVA: ICD-10-CM

## 2022-04-14 DIAGNOSIS — E66.9 OBESITY, UNSPECIFIED: ICD-10-CM

## 2022-06-27 ENCOUNTER — NON-APPOINTMENT (OUTPATIENT)
Age: 32
End: 2022-06-27

## 2022-06-29 ENCOUNTER — NON-APPOINTMENT (OUTPATIENT)
Age: 32
End: 2022-06-29

## 2022-07-18 ENCOUNTER — LABORATORY RESULT (OUTPATIENT)
Age: 32
End: 2022-07-18

## 2022-07-18 ENCOUNTER — APPOINTMENT (OUTPATIENT)
Dept: INFECTIOUS DISEASE | Facility: CLINIC | Age: 32
End: 2022-07-18

## 2022-07-18 ENCOUNTER — OUTPATIENT (OUTPATIENT)
Dept: OUTPATIENT SERVICES | Facility: HOSPITAL | Age: 32
LOS: 1 days | End: 2022-07-18
Payer: MEDICAID

## 2022-07-18 VITALS
HEART RATE: 62 BPM | TEMPERATURE: 97.9 F | HEIGHT: 71 IN | OXYGEN SATURATION: 98 % | BODY MASS INDEX: 28.7 KG/M2 | WEIGHT: 205 LBS | DIASTOLIC BLOOD PRESSURE: 70 MMHG | SYSTOLIC BLOOD PRESSURE: 118 MMHG

## 2022-07-18 DIAGNOSIS — B97.89 OTHER VIRAL AGENTS AS THE CAUSE OF DISEASES CLASSIFIED ELSEWHERE: ICD-10-CM

## 2022-07-18 PROCEDURE — G0463: CPT

## 2022-07-18 PROCEDURE — 99213 OFFICE O/P EST LOW 20 MIN: CPT

## 2022-07-18 RX ORDER — BETAMETHASONE DIPROPIONATE 0.5 MG/G
0.05 CREAM, AUGMENTED TOPICAL
Qty: 30 | Refills: 0 | Status: ACTIVE | COMMUNITY
Start: 2022-02-01

## 2022-07-18 RX ORDER — TRIAMCINOLONE ACETONIDE 55 UG/1
55 SOLUTION/ DROPS OPHTHALMIC
Qty: 17 | Refills: 0 | Status: ACTIVE | COMMUNITY
Start: 2021-12-28

## 2022-07-18 RX ORDER — TRIAMCINOLONE ACETONIDE 1 MG/G
0.1 OINTMENT TOPICAL
Qty: 80 | Refills: 0 | Status: ACTIVE | COMMUNITY
Start: 2021-12-28

## 2022-07-18 RX ORDER — CLINDAMYCIN PHOSPHATE 10 MG/ML
1 SOLUTION TOPICAL
Qty: 60 | Refills: 0 | Status: ACTIVE | COMMUNITY
Start: 2021-05-17

## 2022-07-18 RX ORDER — MUPIROCIN 20 MG/G
2 OINTMENT TOPICAL
Qty: 22 | Refills: 0 | Status: ACTIVE | COMMUNITY
Start: 2022-05-02

## 2022-07-18 RX ORDER — SULFAMETHOXAZOLE AND TRIMETHOPRIM 800; 160 MG/1; MG/1
800-160 TABLET ORAL
Refills: 0 | Status: DISCONTINUED | COMMUNITY
Start: 2022-04-11 | End: 2022-07-18

## 2022-07-18 RX ORDER — ERYTHROMYCIN 20 MG/ML
2 SOLUTION TOPICAL
Qty: 60 | Refills: 0 | Status: ACTIVE | COMMUNITY
Start: 2022-05-02

## 2022-07-18 NOTE — PHYSICAL EXAM
[General Appearance - Alert] : alert [General Appearance - In No Acute Distress] : in no acute distress [Sclera] : the sclera and conjunctiva were normal [PERRL With Normal Accommodation] : pupils were equal in size, round, reactive to light [Extraocular Movements] : extraocular movements were intact [Outer Ear] : the ears and nose were normal in appearance [Oropharynx] : the oropharynx was normal with no thrush [Neck Appearance] : the appearance of the neck was normal [Neck Cervical Mass (___cm)] : no neck mass was observed [Jugular Venous Distention Increased] : there was no jugular-venous distention [Thyroid Diffuse Enlargement] : the thyroid was not enlarged [Auscultation Breath Sounds / Voice Sounds] : lungs were clear to auscultation bilaterally [Heart Rate And Rhythm] : heart rate was normal and rhythm regular [Heart Sounds] : normal S1 and S2 [Murmurs] : no murmurs [Heart Sounds Gallop] : no gallops [Heart Sounds Pericardial Friction Rub] : no pericardial rub [Edema] : there was no peripheral edema [No Palpable Adenopathy] : no palpable adenopathy [Musculoskeletal - Swelling] : no joint swelling [Nail Clubbing] : no clubbing  or cyanosis of the fingernails [Motor Tone] : muscle strength and tone were normal [Skin Color & Pigmentation] : normal skin color and pigmentation [] : no rash [Oriented To Time, Place, And Person] : oriented to person, place, and time [Affect] : the affect was normal

## 2022-07-18 NOTE — ASSESSMENT
[FreeTextEntry1] : appears well\par tolerating PreExposure Prophylaxis with Truvada\par elevated H/H of unclear significance - I did not ask about androgens, supplements.\par \par repeat labs today\par \par Offered MonkeyPox vaccination -\par -nature of Monkey pox, increasing prevalence in MSWM and potential for sexual transmission reviewed\par he declines monkey pox vaccination [Treatment Education] : treatment education [Medical Care Issues] : medical care issues

## 2022-07-18 NOTE — CONSULT LETTER
[Dear  ___] : Dear  [unfilled], [Consult Letter:] : I had the pleasure of evaluating your patient, [unfilled]. [Please see my note below.] : Please see my note below. [Consult Closing:] : Thank you very much for allowing me to participate in the care of this patient.  If you have any questions, please do not hesitate to contact me. [Sincerely,] : Sincerely, [FreeTextEntry2] : Dr Wily Barkley\par [address]\[place] [address] [FreeTextEntry3] : Matthew Goss MD, FACP, MELISA, AAHIVM\par Infectious Diseases\par [facility]

## 2022-07-18 NOTE — HISTORY OF PRESENT ILLNESS
[FreeTextEntry1] : Doing well\par not smoking\par exercises in gym 5 days per week\par lost 20# since 4/1/22 - current weight = 205#  BMI = 28.59\par He feels great\par \par sexually active\par unsafe sexual contacts\par no penile, rectal discharge\par \par taking Truvada regularly.\par most recent labs 4/11/22 reviewed in detail. neg for STD's H/H = 16.7/52.9 of unclear significance

## 2022-07-19 DIAGNOSIS — Z11.3 ENCOUNTER FOR SCREENING FOR INFECTIONS WITH A PREDOMINANTLY SEXUAL MODE OF TRANSMISSION: ICD-10-CM

## 2022-07-19 LAB
ALBUMIN SERPL ELPH-MCNC: 4.6 G/DL
ALP BLD-CCNC: 83 U/L
ALT SERPL-CCNC: 31 U/L
ANION GAP SERPL CALC-SCNC: 10 MMOL/L
AST SERPL-CCNC: 17 U/L
BASOPHILS # BLD AUTO: 0.06 K/UL
BASOPHILS NFR BLD AUTO: 0.8 %
BILIRUB SERPL-MCNC: 1 MG/DL
BUN SERPL-MCNC: 11 MG/DL
CALCIUM SERPL-MCNC: 9.7 MG/DL
CHLORIDE SERPL-SCNC: 104 MMOL/L
CO2 SERPL-SCNC: 26 MMOL/L
CREAT SERPL-MCNC: 0.98 MG/DL
EGFR: 106 ML/MIN/1.73M2
EOSINOPHIL # BLD AUTO: 0.1 K/UL
EOSINOPHIL NFR BLD AUTO: 1.3 %
ESTIMATED AVERAGE GLUCOSE: 108 MG/DL
GLUCOSE SERPL-MCNC: 91 MG/DL
HBA1C MFR BLD HPLC: 5.4 %
HBV CORE IGM SER QL: NONREACTIVE
HBV SURFACE AG SER QL: NONREACTIVE
HCT VFR BLD CALC: 50 %
HCV AB SER QL: NONREACTIVE
HCV S/CO RATIO: 0.12 S/CO
HEPATITIS A IGG ANTIBODY: REACTIVE
HGB BLD-MCNC: 15.8 G/DL
HIV1+2 AB SPEC QL IA.RAPID: NONREACTIVE
HSV 1+2 IGG SER IA-IMP: NEGATIVE
HSV 1+2 IGG SER IA-IMP: POSITIVE
HSV1 IGG SER QL: 40.9 INDEX
HSV2 IGG SER QL: 0.52 INDEX
IMM GRANULOCYTES NFR BLD AUTO: 0.3 %
LYMPHOCYTES # BLD AUTO: 2.1 K/UL
LYMPHOCYTES NFR BLD AUTO: 27.9 %
MAN DIFF?: NORMAL
MCHC RBC-ENTMCNC: 28.1 PG
MCHC RBC-ENTMCNC: 31.6 GM/DL
MCV RBC AUTO: 88.8 FL
MONOCYTES # BLD AUTO: 0.76 K/UL
MONOCYTES NFR BLD AUTO: 10.1 %
NEUTROPHILS # BLD AUTO: 4.5 K/UL
NEUTROPHILS NFR BLD AUTO: 59.6 %
PLATELET # BLD AUTO: 262 K/UL
POTASSIUM SERPL-SCNC: 4.7 MMOL/L
PROT SERPL-MCNC: 7 G/DL
RBC # BLD: 5.63 M/UL
RBC # FLD: 13.2 %
SODIUM SERPL-SCNC: 140 MMOL/L
WBC # FLD AUTO: 7.54 K/UL

## 2022-07-20 LAB
C TRACH RRNA SPEC QL NAA+PROBE: NOT DETECTED
N GONORRHOEA RRNA SPEC QL NAA+PROBE: NOT DETECTED
SOURCE AMPLIFICATION: NORMAL
SOURCE ANAL: NORMAL
SOURCE ORAL: NORMAL
T PALLIDUM AB SER QL IA: POSITIVE

## 2022-07-26 ENCOUNTER — APPOINTMENT (OUTPATIENT)
Dept: INFECTIOUS DISEASE | Facility: CLINIC | Age: 32
End: 2022-07-26

## 2022-07-26 ENCOUNTER — NON-APPOINTMENT (OUTPATIENT)
Age: 32
End: 2022-07-26

## 2022-07-26 ENCOUNTER — OUTPATIENT (OUTPATIENT)
Dept: OUTPATIENT SERVICES | Facility: HOSPITAL | Age: 32
LOS: 1 days | End: 2022-07-26
Payer: MEDICAID

## 2022-07-26 DIAGNOSIS — B04 MONKEYPOX: ICD-10-CM

## 2022-07-26 DIAGNOSIS — B97.89 OTHER VIRAL AGENTS AS THE CAUSE OF DISEASES CLASSIFIED ELSEWHERE: ICD-10-CM

## 2022-07-26 PROCEDURE — G0463: CPT

## 2022-07-26 PROCEDURE — 99213 OFFICE O/P EST LOW 20 MIN: CPT

## 2022-07-26 NOTE — PHYSICAL EXAM
[General Appearance - Alert] : alert [General Appearance - In No Acute Distress] : in no acute distress [Sclera] : the sclera and conjunctiva were normal [PERRL With Normal Accommodation] : pupils were equal in size, round, reactive to light [Extraocular Movements] : extraocular movements were intact [] : no respiratory distress [FreeTextEntry1] : as described in HPI

## 2022-07-26 NOTE — REVIEW OF SYSTEMS
[Feeling Sick] : feeling sick [Feeling Tired] : feeling tired [As Noted in HPI] : as noted in HPI [Negative] : Heme/Lymph [FreeTextEntry2] : now improving

## 2022-07-26 NOTE — HISTORY OF PRESENT ILLNESS
[Sexually Active] : The patient is sexually active [FreeTextEntry1] : sexually active\par developed flu like complaints\par malaise, cough, sore throat\par now improved\par developed carpet of papules on penis nearly confluent at base in superpubic region with numerous papules on shaft  - small solid appearing\par \par no dysuria, penile discharge, rectal pain, anal discharge\par flu symptoms resolved\par \par he notes anxious, feels shame

## 2022-07-27 DIAGNOSIS — U07.1 COVID-19: ICD-10-CM

## 2022-07-27 LAB
RAPID RVP RESULT: DETECTED
SARS-COV-2 RNA PNL RESP NAA+PROBE: DETECTED

## 2022-07-28 LAB
HSV+VZV DNA SPEC QL NAA+PROBE: NOT DETECTED
SPECIMEN SOURCE: NORMAL

## 2022-07-29 ENCOUNTER — TRANSCRIPTION ENCOUNTER (OUTPATIENT)
Age: 32
End: 2022-07-29

## 2022-07-31 ENCOUNTER — INPATIENT (INPATIENT)
Facility: HOSPITAL | Age: 32
LOS: 5 days | Discharge: HOME | DRG: 178 | End: 2022-08-06
Attending: STUDENT IN AN ORGANIZED HEALTH CARE EDUCATION/TRAINING PROGRAM | Admitting: HOSPITALIST
Payer: MEDICAID

## 2022-07-31 ENCOUNTER — NON-APPOINTMENT (OUTPATIENT)
Age: 32
End: 2022-07-31

## 2022-07-31 VITALS
WEIGHT: 181 LBS | RESPIRATION RATE: 18 BRPM | DIASTOLIC BLOOD PRESSURE: 87 MMHG | OXYGEN SATURATION: 99 % | HEART RATE: 98 BPM | TEMPERATURE: 100 F | HEIGHT: 71 IN | SYSTOLIC BLOOD PRESSURE: 135 MMHG

## 2022-07-31 DIAGNOSIS — R50.9 FEVER, UNSPECIFIED: ICD-10-CM

## 2022-07-31 LAB
ALBUMIN SERPL ELPH-MCNC: 4.2 G/DL — SIGNIFICANT CHANGE UP (ref 3.3–5)
ALP SERPL-CCNC: 94 U/L — SIGNIFICANT CHANGE UP (ref 40–120)
ALT FLD-CCNC: 31 U/L — SIGNIFICANT CHANGE UP (ref 10–45)
ANION GAP SERPL CALC-SCNC: 11 MMOL/L — SIGNIFICANT CHANGE UP (ref 5–17)
AST SERPL-CCNC: 24 U/L — SIGNIFICANT CHANGE UP (ref 10–40)
BILIRUB SERPL-MCNC: 0.4 MG/DL — SIGNIFICANT CHANGE UP (ref 0.2–1.2)
BUN SERPL-MCNC: 10 MG/DL — SIGNIFICANT CHANGE UP (ref 7–23)
CALCIUM SERPL-MCNC: 9.5 MG/DL — SIGNIFICANT CHANGE UP (ref 8.4–10.5)
CHLORIDE SERPL-SCNC: 100 MMOL/L — SIGNIFICANT CHANGE UP (ref 96–108)
CO2 SERPL-SCNC: 26 MMOL/L — SIGNIFICANT CHANGE UP (ref 22–31)
CREAT SERPL-MCNC: 1.04 MG/DL — SIGNIFICANT CHANGE UP (ref 0.5–1.3)
EGFR: 99 ML/MIN/1.73M2 — SIGNIFICANT CHANGE UP
GLUCOSE SERPL-MCNC: 101 MG/DL — HIGH (ref 70–99)
HCT VFR BLD CALC: 50.3 % — HIGH (ref 39–50)
HGB BLD-MCNC: 16.7 G/DL — SIGNIFICANT CHANGE UP (ref 13–17)
MCHC RBC-ENTMCNC: 28.1 PG — SIGNIFICANT CHANGE UP (ref 27–34)
MCHC RBC-ENTMCNC: 33.2 GM/DL — SIGNIFICANT CHANGE UP (ref 32–36)
MCV RBC AUTO: 84.7 FL — SIGNIFICANT CHANGE UP (ref 80–100)
PLATELET # BLD AUTO: 183 K/UL — SIGNIFICANT CHANGE UP (ref 150–400)
POTASSIUM SERPL-MCNC: 4.2 MMOL/L — SIGNIFICANT CHANGE UP (ref 3.5–5.3)
POTASSIUM SERPL-SCNC: 4.2 MMOL/L — SIGNIFICANT CHANGE UP (ref 3.5–5.3)
PROT SERPL-MCNC: 7.6 G/DL — SIGNIFICANT CHANGE UP (ref 6–8.3)
RBC # BLD: 5.94 M/UL — HIGH (ref 4.2–5.8)
RBC # FLD: 12.3 % — SIGNIFICANT CHANGE UP (ref 10.3–14.5)
SODIUM SERPL-SCNC: 137 MMOL/L — SIGNIFICANT CHANGE UP (ref 135–145)
WBC # BLD: 6.53 K/UL — SIGNIFICANT CHANGE UP (ref 3.8–10.5)
WBC # FLD AUTO: 6.53 K/UL — SIGNIFICANT CHANGE UP (ref 3.8–10.5)

## 2022-07-31 PROCEDURE — 99285 EMERGENCY DEPT VISIT HI MDM: CPT

## 2022-07-31 RX ORDER — ACETAMINOPHEN 500 MG
975 TABLET ORAL ONCE
Refills: 0 | Status: COMPLETED | OUTPATIENT
Start: 2022-07-31 | End: 2022-07-31

## 2022-07-31 RX ORDER — KETOROLAC TROMETHAMINE 30 MG/ML
15 SYRINGE (ML) INJECTION ONCE
Refills: 0 | Status: DISCONTINUED | OUTPATIENT
Start: 2022-07-31 | End: 2022-07-31

## 2022-07-31 RX ORDER — IBUPROFEN 200 MG
600 TABLET ORAL ONCE
Refills: 0 | Status: DISCONTINUED | OUTPATIENT
Start: 2022-07-31 | End: 2022-07-31

## 2022-07-31 RX ADMIN — Medication 975 MILLIGRAM(S): at 23:13

## 2022-07-31 RX ADMIN — Medication 15 MILLIGRAM(S): at 23:13

## 2022-07-31 NOTE — ED ADULT NURSE NOTE - OBJECTIVE STATEMENT
30 y/o Male with no reported pmh presenting to ED after being sent in by ID for covid + and lesions to entire body and genital area that has been swabbed and sent to r/o monkey pox. pt has had persistent fever and body aches with swollen lymph nodes x the passed week. states he swabbed positive 1 week ago and then saw his ID after getting lesions to his genitals that have now spread to his extremities and fingers, verbalizes pain to the sites along with body pain and aching, states fever has been unable to break over the passed week. denies any cp, sob, dizziness, nausea, vomiting, urinary s/s, or known sick contacts. states his ID doc sent him in for admission and treatment for monkey pox in which pt will undergo experimental treatment starting tomm. VS stable. call bell within reach. pt pending bed assignment.

## 2022-07-31 NOTE — ED PROVIDER NOTE - PHYSICAL EXAMINATION
On Physical Exam:  General: well appearing, in NAD, speaking clearly in full sentences and without difficulty; cooperative with exam  HEENT: anicteric sclera, airway patent; R eye, inferior lid, small ? stye  Neck: no neck tenderness, no nuchal rigidity  Cardiac: normal s1, s2; RRR; no MGR  Lungs: CTABL  Abdomen: soft nontender/nondistended  : no bladder tenderness or distension  Skin: diffuse scattrerd ulcerated lesions with bulae, some scabbing, no purulent discharge  Extremities: no peripheral edema, no gross deformities  Neuro: A&Ox3, no nuchal rigidity

## 2022-07-31 NOTE — ED ADULT TRIAGE NOTE - CHIEF COMPLAINT QUOTE
positive for covid last saturday; has genital lesions and lesions on arms/fingers/legs; lesions were swabbed; no results yet

## 2022-07-31 NOTE — ED ADULT NURSE REASSESSMENT NOTE - NS ED NURSE REASSESS COMMENT FT1
received report from MALORIE Alicia. pt is comfortable resting in stretcher with side rails up for safety and call bell in reach. pt is A&Ox3, VSS, NAD noted at this time. pt admitted to Medicine, pending bed assignment.

## 2022-07-31 NOTE — ED PROVIDER NOTE - PROGRESS NOTE DETAILS
Jaime ARIAS (PGY-3)  spoke to ID fellow who sent pt in. pt will require experimental Attending note (Reid): per ID, recommending admission for trial of antiviral; ID to document formal recommendations; hospitalist accepts admission. Jaime ARIAS (PGY-3)  spoke to ID fellow who sent pt in. pt will require experimental antivirals due to systemic symptoms and pain from lesions

## 2022-07-31 NOTE — ED PROVIDER NOTE - OBJECTIVE STATEMENT
Attending note (Reid): 32 y/o M with no reported medical comorbidities, on Truvada for prep; presenting with persistent fever for the past 1 week intermittently improving with tylenol; rash for the past 6 days, ulcerations/lesions started on penis now spreading to body/hands; +cough, +congestion, positive for covid 19 1 week ago. Notable that he had seen ID clinic (follows with Dr MIROSLAVA Goss) and was evaluated for high suspicion for monkeypox on 7/26; no results from scraping as of yet.  No SOB/chest pain. No vomiting/diarrhea, no difficulty urinating.

## 2022-07-31 NOTE — ED PROVIDER NOTE - CLINICAL SUMMARY MEDICAL DECISION MAKING FREE TEXT BOX
Attending note (Reid): COVID: continue symptomatic treatment, normal lung sounds, not hypoxic/tachypnea; no indication for decadron; rash, high concern for monkey pox based on exam and high risk; will consult with ID.

## 2022-07-31 NOTE — ED PROVIDER NOTE - NS ED ROS FT
Review of Systems:  -General: +fever +chills  -ENT: +congestion, no difficulty swallowing  -Pulmonary: +cough, no shortness of breath  -Cardiac: no chest pain, no palpitations  -Gastrointestinal: no abdominal pain, no nausea, no vomiting, and no diarrhea.  -Genitourinary: no blood or pain with urination; see hpi  -Musculoskeletal: no back or neck pain  -Skin: see hpi  -Endocrine: No h/o diabetes  -Neurologic: No new weakness or numbness in extremities    All else negative unless otherwise specified elsewhere in this note.

## 2022-08-01 DIAGNOSIS — B04 MONKEYPOX: ICD-10-CM

## 2022-08-01 DIAGNOSIS — U07.1 COVID-19: ICD-10-CM

## 2022-08-01 DIAGNOSIS — Z29.9 ENCOUNTER FOR PROPHYLACTIC MEASURES, UNSPECIFIED: ICD-10-CM

## 2022-08-01 LAB
ANION GAP SERPL CALC-SCNC: 11 MMOL/L — SIGNIFICANT CHANGE UP (ref 5–17)
BASOPHILS # BLD AUTO: 0.06 K/UL — SIGNIFICANT CHANGE UP (ref 0–0.2)
BASOPHILS NFR BLD AUTO: 0.9 % — SIGNIFICANT CHANGE UP (ref 0–2)
BUN SERPL-MCNC: 9 MG/DL — SIGNIFICANT CHANGE UP (ref 7–23)
CALCIUM SERPL-MCNC: 9.3 MG/DL — SIGNIFICANT CHANGE UP (ref 8.4–10.5)
CHLORIDE SERPL-SCNC: 101 MMOL/L — SIGNIFICANT CHANGE UP (ref 96–108)
CO2 SERPL-SCNC: 25 MMOL/L — SIGNIFICANT CHANGE UP (ref 22–31)
CREAT SERPL-MCNC: 0.98 MG/DL — SIGNIFICANT CHANGE UP (ref 0.5–1.3)
EGFR: 106 ML/MIN/1.73M2 — SIGNIFICANT CHANGE UP
EOSINOPHIL # BLD AUTO: 0 K/UL — SIGNIFICANT CHANGE UP (ref 0–0.5)
EOSINOPHIL NFR BLD AUTO: 0 % — SIGNIFICANT CHANGE UP (ref 0–6)
FLUAV AG NPH QL: SIGNIFICANT CHANGE UP
FLUBV AG NPH QL: SIGNIFICANT CHANGE UP
GLUCOSE SERPL-MCNC: 96 MG/DL — SIGNIFICANT CHANGE UP (ref 70–99)
HCT VFR BLD CALC: 47.9 % — SIGNIFICANT CHANGE UP (ref 39–50)
HGB BLD-MCNC: 15.8 G/DL — SIGNIFICANT CHANGE UP (ref 13–17)
HIV 1+2 AB+HIV1 P24 AG SERPL QL IA: SIGNIFICANT CHANGE UP
LYMPHOCYTES # BLD AUTO: 1.37 K/UL — SIGNIFICANT CHANGE UP (ref 1–3.3)
LYMPHOCYTES # BLD AUTO: 21 % — SIGNIFICANT CHANGE UP (ref 13–44)
MAGNESIUM SERPL-MCNC: 2.1 MG/DL — SIGNIFICANT CHANGE UP (ref 1.6–2.6)
MANUAL SMEAR VERIFICATION: SIGNIFICANT CHANGE UP
MCHC RBC-ENTMCNC: 27.7 PG — SIGNIFICANT CHANGE UP (ref 27–34)
MCHC RBC-ENTMCNC: 33 GM/DL — SIGNIFICANT CHANGE UP (ref 32–36)
MCV RBC AUTO: 83.9 FL — SIGNIFICANT CHANGE UP (ref 80–100)
MONOCYTES # BLD AUTO: 0.17 K/UL — SIGNIFICANT CHANGE UP (ref 0–0.9)
MONOCYTES NFR BLD AUTO: 2.6 % — SIGNIFICANT CHANGE UP (ref 2–14)
NEUTROPHILS # BLD AUTO: 4.7 K/UL — SIGNIFICANT CHANGE UP (ref 1.8–7.4)
NEUTROPHILS NFR BLD AUTO: 70.2 % — SIGNIFICANT CHANGE UP (ref 43–77)
NEUTS BAND # BLD: 1.8 % — SIGNIFICANT CHANGE UP (ref 0–8)
NRBC # BLD: 0 /100 WBCS — SIGNIFICANT CHANGE UP (ref 0–0)
PHOSPHATE SERPL-MCNC: 3.8 MG/DL — SIGNIFICANT CHANGE UP (ref 2.5–4.5)
PLAT MORPH BLD: NORMAL — SIGNIFICANT CHANGE UP
PLATELET # BLD AUTO: 165 K/UL — SIGNIFICANT CHANGE UP (ref 150–400)
POTASSIUM SERPL-MCNC: 3.8 MMOL/L — SIGNIFICANT CHANGE UP (ref 3.5–5.3)
POTASSIUM SERPL-SCNC: 3.8 MMOL/L — SIGNIFICANT CHANGE UP (ref 3.5–5.3)
RBC # BLD: 5.71 M/UL — SIGNIFICANT CHANGE UP (ref 4.2–5.8)
RBC # FLD: 12.4 % — SIGNIFICANT CHANGE UP (ref 10.3–14.5)
RBC BLD AUTO: NORMAL — SIGNIFICANT CHANGE UP
RSV RNA NPH QL NAA+NON-PROBE: SIGNIFICANT CHANGE UP
SARS-COV-2 RNA SPEC QL NAA+PROBE: DETECTED
SODIUM SERPL-SCNC: 137 MMOL/L — SIGNIFICANT CHANGE UP (ref 135–145)
VARIANT LYMPHS # BLD: 3.5 % — SIGNIFICANT CHANGE UP (ref 0–6)
WBC # BLD: 5.87 K/UL — SIGNIFICANT CHANGE UP (ref 3.8–10.5)
WBC # FLD AUTO: 5.87 K/UL — SIGNIFICANT CHANGE UP (ref 3.8–10.5)

## 2022-08-01 PROCEDURE — 99222 1ST HOSP IP/OBS MODERATE 55: CPT

## 2022-08-01 PROCEDURE — 99223 1ST HOSP IP/OBS HIGH 75: CPT | Mod: GC

## 2022-08-01 PROCEDURE — 99253 IP/OBS CNSLTJ NEW/EST LOW 45: CPT | Mod: GC

## 2022-08-01 PROCEDURE — 99221 1ST HOSP IP/OBS SF/LOW 40: CPT

## 2022-08-01 PROCEDURE — 99253 IP/OBS CNSLTJ NEW/EST LOW 45: CPT

## 2022-08-01 RX ORDER — ACETAMINOPHEN 500 MG
650 TABLET ORAL EVERY 6 HOURS
Refills: 0 | Status: DISCONTINUED | OUTPATIENT
Start: 2022-08-01 | End: 2022-08-06

## 2022-08-01 RX ORDER — ERYTHROMYCIN BASE 5 MG/GRAM
1 OINTMENT (GRAM) OPHTHALMIC (EYE)
Refills: 0 | Status: DISCONTINUED | OUTPATIENT
Start: 2022-08-01 | End: 2022-08-02

## 2022-08-01 RX ORDER — TRAMADOL HYDROCHLORIDE 50 MG/1
25 TABLET ORAL EVERY 6 HOURS
Refills: 0 | Status: DISCONTINUED | OUTPATIENT
Start: 2022-08-01 | End: 2022-08-02

## 2022-08-01 RX ORDER — MORPHINE SULFATE 50 MG/1
2 CAPSULE, EXTENDED RELEASE ORAL ONCE
Refills: 0 | Status: DISCONTINUED | OUTPATIENT
Start: 2022-08-01 | End: 2022-08-01

## 2022-08-01 RX ORDER — ONDANSETRON 8 MG/1
4 TABLET, FILM COATED ORAL EVERY 8 HOURS
Refills: 0 | Status: DISCONTINUED | OUTPATIENT
Start: 2022-08-01 | End: 2022-08-06

## 2022-08-01 RX ORDER — MUPIROCIN 20 MG/G
1 OINTMENT TOPICAL
Refills: 0 | Status: DISCONTINUED | OUTPATIENT
Start: 2022-08-01 | End: 2022-08-06

## 2022-08-01 RX ORDER — IBUPROFEN 200 MG
600 TABLET ORAL THREE TIMES A DAY
Refills: 0 | Status: DISCONTINUED | OUTPATIENT
Start: 2022-08-01 | End: 2022-08-06

## 2022-08-01 RX ORDER — LANOLIN ALCOHOL/MO/W.PET/CERES
3 CREAM (GRAM) TOPICAL AT BEDTIME
Refills: 0 | Status: DISCONTINUED | OUTPATIENT
Start: 2022-08-01 | End: 2022-08-06

## 2022-08-01 RX ORDER — EMTRICITABINE AND TENOFOVIR DISOPROXIL FUMARATE 200; 300 MG/1; MG/1
1 TABLET, FILM COATED ORAL DAILY
Refills: 0 | Status: DISCONTINUED | OUTPATIENT
Start: 2022-08-01 | End: 2022-08-06

## 2022-08-01 RX ORDER — SODIUM CHLORIDE 9 MG/ML
500 INJECTION, SOLUTION INTRAVENOUS ONCE
Refills: 0 | Status: COMPLETED | OUTPATIENT
Start: 2022-08-01 | End: 2022-08-01

## 2022-08-01 RX ADMIN — MORPHINE SULFATE 2 MILLIGRAM(S): 50 CAPSULE, EXTENDED RELEASE ORAL at 20:43

## 2022-08-01 RX ADMIN — MUPIROCIN 1 APPLICATION(S): 20 OINTMENT TOPICAL at 18:48

## 2022-08-01 RX ADMIN — EMTRICITABINE AND TENOFOVIR DISOPROXIL FUMARATE 1 TABLET(S): 200; 300 TABLET, FILM COATED ORAL at 13:52

## 2022-08-01 RX ADMIN — SODIUM CHLORIDE 500 MILLILITER(S): 9 INJECTION, SOLUTION INTRAVENOUS at 03:52

## 2022-08-01 RX ADMIN — Medication 650 MILLIGRAM(S): at 06:47

## 2022-08-01 RX ADMIN — Medication 1 DROP(S): at 06:47

## 2022-08-01 RX ADMIN — Medication 650 MILLIGRAM(S): at 13:52

## 2022-08-01 RX ADMIN — Medication 650 MILLIGRAM(S): at 07:30

## 2022-08-01 RX ADMIN — TRAMADOL HYDROCHLORIDE 25 MILLIGRAM(S): 50 TABLET ORAL at 23:15

## 2022-08-01 RX ADMIN — MORPHINE SULFATE 2 MILLIGRAM(S): 50 CAPSULE, EXTENDED RELEASE ORAL at 21:00

## 2022-08-01 RX ADMIN — Medication 650 MILLIGRAM(S): at 14:50

## 2022-08-01 RX ADMIN — Medication 650 MILLIGRAM(S): at 18:00

## 2022-08-01 RX ADMIN — Medication 600 MILLIGRAM(S): at 23:15

## 2022-08-01 NOTE — CHART NOTE - NSCHARTNOTEFT_GEN_A_CORE
Infectious Disease Attending    Tecovirmat reviewed, potential adverse events discussed  Together with clinical coordinator reviewed informed consent document  patient given opportunity to ask questions  He was anxious to begin treatment as soon as possible  Informed consent document signed, copy to patient and hard copy to chart    discussed with Pharmacy  Tecovirmat initiated - first dose 1410      Matthew Goss MD

## 2022-08-01 NOTE — H&P ADULT - ASSESSMENT
31 year old male with no past medical history, on Truvada for PREP who presents with fevers and chills in the setting of acute monkeypox infection and/or COVID infection. Skin exam is highly concerning for Monkeypox. Will swab the patient for monkey pox.

## 2022-08-01 NOTE — H&P ADULT - NSHPPHYSICALEXAM_GEN_ALL_CORE
Vital Signs Last 24 Hrs  T(C): 36.8 (01 Aug 2022 02:10), Max: 37.6 (31 Jul 2022 23:12)  T(F): 98.3 (01 Aug 2022 02:10), Max: 99.6 (31 Jul 2022 23:12)  HR: 67 (01 Aug 2022 02:10) (67 - 98)  BP: 128/73 (01 Aug 2022 02:10) (128/73 - 142/74)  BP(mean): --  RR: 18 (01 Aug 2022 02:10) (18 - 18)  SpO2: 97% (01 Aug 2022 02:10) (97% - 99%)    Parameters below as of 01 Aug 2022 02:10  Patient On (Oxygen Delivery Method): room air

## 2022-08-01 NOTE — PROGRESS NOTE ADULT - PROBLEM SELECTOR PLAN 1
- Per ED conversation with ID, ID is considering antiviral therapy for monkey pox given the patient's systemic symptoms  - ID consulted, will enroll in TPOXX trial   - f/u monkeypox PCR  - outpatient HSV 1/2 swab on 7/26 is negative  - recent RPP and GC/ clamydia outpatient were negative  - HIV negative  - continue home PREP, Truvada  - contact and airborne precaution - Per ED conversation with ID, ID is considering antiviral therapy for monkey pox given the patient's systemic symptoms  - ID consulted, will enroll in TPOXX trial. Recs appreciated  - Ophthalmology consulted for ocular involvement  - f/u monkeypox PCR  - outpatient HSV 1/2 swab on 7/26 is negative  - recent RPP and GC/ clamydia outpatient were negative  - HIV negative  - continue home PREP, Truvada  - contact and airborne precaution

## 2022-08-01 NOTE — PATIENT PROFILE ADULT - FALL HARM RISK - UNIVERSAL INTERVENTIONS
Bed in lowest position, wheels locked, appropriate side rails in place/Call bell, personal items and telephone in reach/Instruct patient to call for assistance before getting out of bed or chair/Non-slip footwear when patient is out of bed/[place] to call system/Physically safe environment - no spills, clutter or unnecessary equipment/Purposeful Proactive Rounding/Room/bathroom lighting operational, light cord in reach

## 2022-08-01 NOTE — H&P ADULT - NSHPLABSRESULTS_GEN_ALL_CORE
LABS:                         16.7   6.53  )-----------( 183      ( 31 Jul 2022 23:08 )             50.3     07-31    137  |  100  |  10  ----------------------------<  101<H>  4.2   |  26  |  1.04    Ca    9.5      31 Jul 2022 23:08    TPro  7.6  /  Alb  4.2  /  TBili  0.4  /  DBili  x   /  AST  24  /  ALT  31  /  AlkPhos  94  07-31                Records reviewed from prior hospitalization.  Labs reviewed remarkable for - CBC unremarkable. CMP unremarkable. COVID-19 detected.

## 2022-08-01 NOTE — H&P ADULT - PROBLEM SELECTOR PLAN 2
tested positive about one week ago  - with mild symptoms  - initial symptoms of cough and nasal congestion are improved  - continue symptomatic treatment

## 2022-08-01 NOTE — CONSULT NOTE ADULT - SUBJECTIVE AND OBJECTIVE BOX
[facility] DEPARTMENT OF OPHTHALMOLOGY - INITIAL ADULT CONSULT  ------------------------------------------------------------------------------------------------------------    HPI:  31 year old male with no past medical history, on Truvada for PREP who presents as instructed by his infectious diseases physician for concern of monkey pox. Patient was in his usual state of health until last Saturday, Jul 23rd when he developed symptoms of nasal congestion and cough. He tested positive for COVID. Then on Monday Jul 25th the patient started to develop numerous pustular lesions on his penis. These lesions were initially painful but now felt to be more inflammed/ itchy. New scattered lesions were noted on his bilateral hands, shoulders and back. He also has two on his left eye, one on the upper eyelid and one in the tearduct. There is no associated blurred vision or sensitivity to light. The patient had fevers and chills at home, which have become worse over the past few days despite initial COVID symptoms improving.    He was seen at ID clinic on 7/26 for these symptoms. Per HIE, lesion HSV 1/2 was not detected. No result for Monkey pox PCR is available.     The patient is vaccinated against COVID however he has not been boosted. He was offered Monkey pox vaccine at outpatient ID visit one week prior to these events; however he declined at that time given concerns for side effects from vaccination. His social history is significant for sexual activity with male. He has two sexual partners who are not known to have similar lesions.    Vitals: T max 37.5C, HR 94, /74, SpO2 97% on room air.  Labs: CBC unremarkable. CMP unremarkable. COVID-19 detected.    ED interventions: acetaminophen 975 mg PO x1, ketorolac 15mg IV x1. (01 Aug 2022 02:11)    Interval History: Pt noted 2 lesions on palpebral conj OD. Pt is anxious that these may be monkeypox lesions. States he feels mild FBS from lesion on RLL, but no eye pain or change in vision.     PAST MEDICAL & SURGICAL HISTORY:  Migraines      No significant past surgical history        Past Ocular History: None  Ophthalmic Medications: None  FAMILY HISTORY: None    Social History:  Former smoker, quit 1 year ago. Social alcohol use. Has sexual contact with males. Is not vaccinated against monkeypox.     MEDICATIONS  (STANDING):  emtricitabine 200 mG/tenofovir 300 mG (TRUVADA) 1 Tablet(s) Oral daily  erythromycin   Ointment 1 Application(s) Right EYE two times a day  mupirocin 2% Ointment 1 Application(s) Topical two times a day  tecovirimat Capsule 600 milliGRAM(s) Oral two times a day after meals    MEDICATIONS  (PRN):  acetaminophen     Tablet .. 650 milliGRAM(s) Oral every 6 hours PRN Temp greater or equal to 38C (100.4F), Mild Pain (1 - 3)  aluminum hydroxide/magnesium hydroxide/simethicone Suspension 30 milliLiter(s) Oral every 4 hours PRN Dyspepsia  artificial tears (preservative free) Ophthalmic Solution 1 Drop(s) Right EYE three times a day PRN Dry Eyes  ibuprofen  Tablet. 600 milliGRAM(s) Oral three times a day PRN Mild Pain (1 - 3)  melatonin 3 milliGRAM(s) Oral at bedtime PRN Insomnia  ondansetron Injectable 4 milliGRAM(s) IV Push every 8 hours PRN Nausea and/or Vomiting  traMADol 25 milliGRAM(s) Oral every 6 hours PRN Moderate Pain (4 - 6)    Allergies & Intolerances:   Clindamycin Phosphate (Unknown)  Neosporin (Rash)  penicillin G potassium (Rash)    Review of Systems:  Constitutional: No fever, chills  Eyes: No blurry vision, flashes, floaters, FBS, erythema, discharge, double vision, OU  Neuro: No tremors  Cardiovascular: No chest pain, palpitations  Respiratory: No SOB, no cough  GI: No nausea, vomiting, abdominal pain  : No dysuria  Skin: + umbilicated rash on back and genitals.   Psych: no depression  Endocrine: no polyuria, polydipsia  Heme/lymph: no swelling    VITALS: T(C): 36.8 (08-01-22 @ 18:22)  T(F): 98.3 (08-01-22 @ 18:22), Max: 99.6 (07-31-22 @ 23:12)  HR: 60 (08-01-22 @ 18:22) (60 - 94)  BP: 144/77 (08-01-22 @ 18:22) (128/73 - 144/77)  RR:  (18 - 18)  SpO2:  (96% - 98%)  Wt(kg): --  General: AAO x 3, appropriate mood and affect    Ophthalmology Exam:  Visual acuity (sc): 20/20 OU  Pupils: PERRL OU, no APD  Ttono: 12 OD, 14 OS  Extraocular movements (EOMs): Full OU, no pain, no diplopia  Confrontational Visual Field (CVF): Full OU  Color Plates: 12/12 OU    Pen Light Exam (PLE)  External: Flat OU  Lids/Lashes/Lacrimal Ducts: Single raised umbilicated lesion nasally RUL along lid margin, single raised umbilicated lesions nasally RLL on palpebral conj  Sclera/Conjunctiva: W+Q OU  Cornea: Cl OU, no staining  Anterior Chamber: D+F OU    Iris: Flat OU  Lens: Cl OU    Fundus Exam: dilated with 1% tropicamide and 2.5% phenylephrine  Approval obtained from primary team for dilation  Patient aware that pupils can remained dilated for at least 4-6 hours  Exam performed with 20D lens    Vitreous: wnl OU  Disc, cup/disc: sharp and pink, 0.4 OU  Macula: wnl OU  Vessels: wnl OU  Periphery: grossly wnl OU     [facility] DEPARTMENT OF OPHTHALMOLOGY - INITIAL ADULT CONSULT  ------------------------------------------------------------------------------------------------------------    HPI:  31 year old male with no past medical history, on Truvada for PREP who presents as instructed by his infectious diseases physician for concern of monkey pox. Patient was in his usual state of health until last Saturday, Jul 23rd when he developed symptoms of nasal congestion and cough. He tested positive for COVID. Then on Monday Jul 25th the patient started to develop numerous pustular lesions on his penis. These lesions were initially painful but now felt to be more inflammed/ itchy. New scattered lesions were noted on his bilateral hands, shoulders and back. He also has two on his left eye, one on the upper eyelid and one in the tearduct. There is no associated blurred vision or sensitivity to light. The patient had fevers and chills at home, which have become worse over the past few days despite initial COVID symptoms improving.    He was seen at ID clinic on 7/26 for these symptoms. Per HIE, lesion HSV 1/2 was not detected. No result for Monkey pox PCR is available.     The patient is vaccinated against COVID however he has not been boosted. He was offered Monkey pox vaccine at outpatient ID visit one week prior to these events; however he declined at that time given concerns for side effects from vaccination. His social history is significant for sexual activity with male. He has two sexual partners who are not known to have similar lesions.    Vitals: T max 37.5C, HR 94, /74, SpO2 97% on room air.  Labs: CBC unremarkable. CMP unremarkable. COVID-19 detected.    ED interventions: acetaminophen 975 mg PO x1, ketorolac 15mg IV x1. (01 Aug 2022 02:11)    Interval History: Pt noted 2 lesions on palpebral conj OD. Pt is anxious that these may be monkeypox lesions. States he feels mild FBS from lesion on RLL, but no eye pain or change in vision.     PAST MEDICAL & SURGICAL HISTORY:  Migraines      No significant past surgical history        Past Ocular History: None  Ophthalmic Medications: None  FAMILY HISTORY: None, no glaucoma, no ARMD    Social History:  Former smoker, quit 1 year ago. Social alcohol use. Has sexual contact with males. Is not vaccinated against monkeypox.     MEDICATIONS  (STANDING):  emtricitabine 200 mG/tenofovir 300 mG (TRUVADA) 1 Tablet(s) Oral daily  erythromycin   Ointment 1 Application(s) Right EYE two times a day  mupirocin 2% Ointment 1 Application(s) Topical two times a day  tecovirimat Capsule 600 milliGRAM(s) Oral two times a day after meals    MEDICATIONS  (PRN):  acetaminophen     Tablet .. 650 milliGRAM(s) Oral every 6 hours PRN Temp greater or equal to 38C (100.4F), Mild Pain (1 - 3)  aluminum hydroxide/magnesium hydroxide/simethicone Suspension 30 milliLiter(s) Oral every 4 hours PRN Dyspepsia  artificial tears (preservative free) Ophthalmic Solution 1 Drop(s) Right EYE three times a day PRN Dry Eyes  ibuprofen  Tablet. 600 milliGRAM(s) Oral three times a day PRN Mild Pain (1 - 3)  melatonin 3 milliGRAM(s) Oral at bedtime PRN Insomnia  ondansetron Injectable 4 milliGRAM(s) IV Push every 8 hours PRN Nausea and/or Vomiting  traMADol 25 milliGRAM(s) Oral every 6 hours PRN Moderate Pain (4 - 6)    Allergies & Intolerances:   Clindamycin Phosphate (Unknown)  Neosporin (Rash)  penicillin G potassium (Rash)    Review of Systems:  Constitutional: No fever, chills  Eyes: No blurry vision, flashes, floaters, FBS, erythema, discharge, double vision, OU  Neuro: No tremors  Cardiovascular: No chest pain, palpitations  Respiratory: No SOB, no cough  GI: No nausea, vomiting, abdominal pain  : No dysuria  Skin: + umbilicated rash on back and genitals.   Psych: no depression  Endocrine: no polyuria, polydipsia  Heme/lymph: no swelling    VITALS: T(C): 36.8 (08-01-22 @ 18:22)  T(F): 98.3 (08-01-22 @ 18:22), Max: 99.6 (07-31-22 @ 23:12)  HR: 60 (08-01-22 @ 18:22) (60 - 94)  BP: 144/77 (08-01-22 @ 18:22) (128/73 - 144/77)  RR:  (18 - 18)  SpO2:  (96% - 98%)  Wt(kg): --  General: AAO x 3, appropriate mood and affect    Ophthalmology Exam:  Visual acuity (sc): 20/20 OU  Pupils: PERRL OU, no APD  Ttono: 12 OD, 14 OS  Extraocular movements (EOMs): Full OU, no pain, no diplopia  Confrontational Visual Field (CVF): Full OU  Color Plates: 12/12 OU    Pen Light Exam (PLE)  External: Flat OU  Lids/Lashes/Lacrimal Ducts: Single raised umbilicated lesion nasally RUL along lid margin, single raised umbilicated lesions nasally RLL on palpebral conj  Sclera/Conjunctiva: W+Q OU  Cornea: Cl OU, no staining  Anterior Chamber: D+F OU    Iris: Flat OU  Lens: Cl OU    Fundus Exam: dilated with 1% tropicamide and 2.5% phenylephrine  Approval obtained from primary team for dilation  Patient aware that pupils can remained dilated for at least 4-6 hours  Exam performed with 20D lens    Vitreous: wnl OU  Disc, cup/disc: sharp and pink, 0.4 OU  Macula: wnl OU  Vessels: wnl OU  Periphery: grossly wnl OU

## 2022-08-01 NOTE — H&P ADULT - PROBLEM SELECTOR PLAN 1
- Per ED conversation with ID, ID is considering antiviral therapy for monkey pox given the patient's systemic symptoms  - please obtain formal ID consult this morning  - will swab the patient for monkey pox  - outpatient HSV 1/2 swab on 7/26 is negative  - recent RPP and GC/ clamydia outpatient were negative  - patient agrees to HIV screening test; will obtain this morning  - continue home PREP, Truvada  - contact and airborne precaution

## 2022-08-01 NOTE — CONSULT NOTE ADULT - ASSESSMENT
Assessment and Recommendations:  31y male w/ no pmhx/ochx, hx unprotected sexual intercourse with males, with progressive skin lesions on penis, back, hands c/w monkeypox, consulted for r/o ocular involvement of monkeypox, found to have one raised umbilicated lesion of palpebral conj on RUL and one on RLL, c/w likely ocular involvement of monkeypox.     1. Ocular involvement of monkeypox  - One raised umbilicated lesion of palpebral conj noted on RUL and one on RLL  - Images of penile lesions reviewed, lesions on conj appear very similar.   - Likely represents ocular involvement of monkeypox  - Corneas are clear, DFE wnl  - F/u monkeypox PCR  - TPOXX trial per ID  - Given lack of well-established guidelines for treatment of ocular monkeypox, recommend preservative-free artificial tears QID and erythromycin ointment BID to R eye.     Outpatient follow-up: Patient should follow-up with his/her ophthalmologist or with [facility] Department of Ophthalmology at the address below     [address] [place] [address]. [address]  [place] [address]  [phone]    Case seen and discussed with Dr. Suh, attending.     Viviana CHOI Rai, MD  PGY-3, Ophthalmology  [phone]    Also available on [employer] Teams. Assessment and Recommendations:  31y male w/ no pmhx/ochx, hx unprotected sexual intercourse with males, with progressive skin lesions on penis, back, hands c/w monkeypox, consulted for r/o ocular involvement of monkeypox, found to have one raised umbilicated lesion of palpebral conj on RUL and one on RLL, c/w likely ocular involvement of monkeypox.     1. Ocular involvement of monkeypox  - One raised umbilicated lesion of palpebral conj noted on RUL and one on RLL  - Images of penile lesions reviewed, lesions on conj appear very similar.   - Likely represents ocular involvement of monkeypox  - Corneas are clear, DFE wnl  - F/u monkeypox PCR  - TPOXX trial per ID  - Given lack of well-established guidelines for treatment of ocular monkeypox, recommend preservative-free artificial tears 6x/day and erythromycin ointment QID to R eye.   - will follow, pt advised to let team know if he has any new or worsening eye complaints    Outpatient follow-up: Patient should follow-up with his/her ophthalmologist or with [facility] Department of Ophthalmology at the address below within 1 week of discharge, sooner if symptoms worsen or change    [address] [place] [address]. [address]  [place] [address]  [phone]    Case seen and discussed with Dr. Suh, attending.     Viviana CHOI Rai, MD  PGY-3, Ophthalmology  [phone]    Also available on [employer] Teams.

## 2022-08-01 NOTE — CONSULT NOTE ADULT - ASSESSMENT
31M on PREP admitted 7/31/22 with progressive lesions consistent with Monkey pox. He has developed lesions in right eyelids and progressive painful lesions at superpubic region, base of penis and shaft with increased pain. He has fever  There has been near complete resolution of COVID related symptoms - he has slight cough, no sob    Monkey PX PCR from 7/26/22 is still pending    Mr Pena is candidate for Tecovirmat (TPOXX) given systemic symptoms, genital involvement with pain and involvement around right eye. He is very motivated to received this medication.   He has anxiety about condition and previously expressed shame about getting this illness    Suggest  TPOXX trial - will pursue  Bactroban ointment to superpubic region  Ophthalmology evaluation

## 2022-08-01 NOTE — CONSULT NOTE ADULT - ATTENDING COMMENTS
I have interviewed and examined the patient and reviewed the residents note including the history, exam, assessment, and plan.  I agree with the residents assessment and plan.  I have made changes where appropriate.    31y male w/ no pmhx/ochx, hx unprotected sexual intercourse with males, with progressive skin lesions on penis, back, hands c/w monkeypox, consulted for r/o ocular involvement of monkeypox, found to have one raised umbilicated lesion of palpebral conj on RUL and one on RLL, c/w likely ocular involvement of monkeypox.     1. Ocular involvement of monkeypox  - One raised umbilicated lesion of palpebral conj noted on RUL and one on RLL  - Images of penile lesions reviewed, lesions on conj appear very similar.   - Likely represents ocular involvement of monkeypox  - Corneas are clear, DFE wnl  - F/u monkeypox PCR  - TPOXX trial per ID  - Given lack of well-established guidelines for treatment of ocular monkeypox, recommend preservative-free artificial tears 6x/day and erythromycin ointment QID to R eye.   - will follow, pt advised to let team know if he has any new or worsening eye complaints    Magui Suh MD

## 2022-08-01 NOTE — H&P ADULT - SKIN
numerous pustules with central umbilication on the groin, penis and scrotum. He has one-two similar lesion on the bilateral finger. There is erythema papule without overt pus on the back (x3 lesions), chest (x1 lesion), right upper eyelid (x1 lesion), and right inner tearduct region (x1 lesion)./warm and dry/color normal

## 2022-08-01 NOTE — CONSULT NOTE ADULT - SUBJECTIVE AND OBJECTIVE BOX
Patient is a 31y old  Male who presents with a chief complaint of concern for monkeypox (01 Aug 2022 02:11)    HPI:  31 year old male with, on Truvada for PREP who presents as instructed by his infectious diseases physician for concern of monkey pox. Patient was in his usual state of health until last Saturday, Jul 23rd when he developed symptoms of nasal congestion and cough. He tested positive for COVID. Then on Monday Jul 25th the patient started to develop numerous pustular lesions on his penis. These lesions were initially painful but now felt to be more inflammed/ itchy. New scattered lesions were noted on his bilateral hands, shoulders and back. He also has two on his left eye, one on the upper eyelid and one in the tearduct. There is no associated blurred vision or sensitivity to light. The patient had fevers and chills at home, which have become worse over the past few days despite initial COVID symptoms improving.    Seen on 7/18/22 in my office - offered Monkey pox vaccine - declines: Neg HIV; Neg Hep C, Neg Hep B Coure ab, Neg GC/Chly mdia x 3 sites    urgent follow up on 7/26 for these symptoms. Per HIE, lesion HSV 1/2 was not detected. Monkey pox PCR is pending    The patient is vaccinated against COVID however he has not been boosted. He was offered Monkey pox vaccine at outpatient ID visit one week prior to these events; however he declined at that time given concerns for side effects from vaccination. His social history is significant for sexual activity with male. He has two sexual partners who are not known to have similar lesions.    Vitals: T max 37.5C, HR 94, /74, SpO2 97% on room air.  Labs: CBC unremarkable. CMP unremarkable. COVID-19 detected.    ED interventions: acetaminophen 975 mg PO x1, ketorolac 15mg IV x1. (01 Aug 2022 02:11)    Confluent lesions on base of penis and superpubic region has become markedly painful and with pruritus  He notes general pain in penis made worse with voiding - though not painful within urethra  He has developed painful ulcer in medial cantus under lid with discomfort and redness in conjunctiva OD  He points out painful area under upper eye lid  He has about 4-5 new lesions on trunk and back. He has having fevers at home    MSWM - only occasionally uses condoms  He has history of Hidradenitis Suppurtiva - recently not active  He has previous obesity and has lost weight, exercises and successfully has healthy lifestyle  COVID vaccination x 2 Moderna 3/23/21, 4/22/21        PAST MEDICAL & SURGICAL HISTORY:  Migraines  previous obesity - lost weight  Hidradenitis suppurtiva  Syphilis treated    Social history: quit smoking 8/21    FAMILY HISTORY:      REVIEW OF SYSTEMS:  CONSTITUTIONAL: No weakness, + fevers or chills  Psych: anxious - fearful about condition  EYES/ENT: No visual changes;  No vertigo or throat pain   NECK: No pain or stiffness  RESPIRATORY: No cough, wheezing, hemoptysis; No shortness of breath  CARDIOVASCULAR: No chest pain or palpitations  GASTROINTESTINAL: No abdominal or epigastric pain. No nausea, vomiting, or hematemesis; No diarrhea or constipation. No melena or hematochezia.  GENITOURINARY: No dysuria, frequency or hematuria  NEUROLOGICAL: No numbness or weakness  SKIN: No itching, burning, rashes, or lesions   All other review of systems is negative unless indicated above    Allergies  Clindamycin Phosphate (Unknown)  Neosporin (Rash)  penicillin G potassium (Rash)      Antimicrobials:  emtricitabine 200 mG/tenofovir 300 mG (TRUVADA) 1 Tablet(s) Oral daily      Vital Signs Last 24 Hrs  T(C): 37.1 (01 Aug 2022 06:30), Max: 37.6 (31 Jul 2022 23:12)  T(F): 98.8 (01 Aug 2022 06:30), Max: 99.6 (31 Jul 2022 23:12)  HR: 73 (01 Aug 2022 06:30) (67 - 98)  BP: 134/78 (01 Aug 2022 06:30) (128/73 - 142/74)  BP(mean): --  RR: 18 (01 Aug 2022 06:30) (18 - 18)  SpO2: 96% (01 Aug 2022 06:30) (96% - 99%)    Parameters below as of 01 Aug 2022 06:30  Patient On (Oxygen Delivery Method): room air        PHYSICAL EXAM:  General: ill appearing, flushed face  EYE conjuntival injection OD  - white ulcer lower medial canthusNAD, Non-toxic  Neurology: A&Ox3, nonfocal  Pscyh: anxious afect  Respiratory: Clear to auscultation bilaterally  CV: RRR, S1S2, no murmurs, rubs or gallops  Abdominal: Soft, Non-tender, non-distended, normal bowel sounds  Extremities: No edema, + peripheral pulses  Line Sites: Clear  Skin: unbilicated flat pustules over trunk and back  Confluent scabbed lesions superpubic region with reactive erthematous base - shaft of penis involved                          15.8   5.87  )-----------( 165      ( 01 Aug 2022 07:21 )             47.9       08-01    137  |  101  |  9   ----------------------------<  96  3.8   |  25  |  0.98    Ca    9.3      01 Aug 2022 07:17  Phos  3.8     08-01  Mg     2.1     08-01    TPro  7.6  /  Alb  4.2  /  TBili  0.4  /  DBili  x   /  AST  24  /  ALT  31  /  AlkPhos  94  07-31    MICROBIOLOGY:  (07.31.22 @ 23:08)  SARS-CoV-2 Result: Detected:     Radiology:  none    Matthew Goss MD; Division of Infectious Disease; Pager: [phone]; nights and weekends: [phone]

## 2022-08-01 NOTE — PROGRESS NOTE ADULT - ASSESSMENT
31M MSM on on Truvada for PREP, p/w fevers and chills, positive for COVID infection and possible monkeypox infection.

## 2022-08-02 LAB
ANION GAP SERPL CALC-SCNC: 8 MMOL/L — SIGNIFICANT CHANGE UP (ref 5–17)
BUN SERPL-MCNC: 9 MG/DL — SIGNIFICANT CHANGE UP (ref 7–23)
CALCIUM SERPL-MCNC: 9 MG/DL — SIGNIFICANT CHANGE UP (ref 8.4–10.5)
CHLORIDE SERPL-SCNC: 103 MMOL/L — SIGNIFICANT CHANGE UP (ref 96–108)
CO2 SERPL-SCNC: 27 MMOL/L — SIGNIFICANT CHANGE UP (ref 22–31)
CREAT SERPL-MCNC: 0.96 MG/DL — SIGNIFICANT CHANGE UP (ref 0.5–1.3)
EGFR: 109 ML/MIN/1.73M2 — SIGNIFICANT CHANGE UP
GLUCOSE SERPL-MCNC: 98 MG/DL — SIGNIFICANT CHANGE UP (ref 70–99)
HCT VFR BLD CALC: 47 % — SIGNIFICANT CHANGE UP (ref 39–50)
HGB BLD-MCNC: 15.3 G/DL — SIGNIFICANT CHANGE UP (ref 13–17)
MAGNESIUM SERPL-MCNC: 1.9 MG/DL — SIGNIFICANT CHANGE UP (ref 1.6–2.6)
MCHC RBC-ENTMCNC: 27.8 PG — SIGNIFICANT CHANGE UP (ref 27–34)
MCHC RBC-ENTMCNC: 32.6 GM/DL — SIGNIFICANT CHANGE UP (ref 32–36)
MCV RBC AUTO: 85.3 FL — SIGNIFICANT CHANGE UP (ref 80–100)
NONVAR ORTHPX DNA SPEC QL NAA+PROBE: DETECTED
NONVAR ORTHPX DNA SPEC QL NAA+PROBE: DETECTED
NRBC # BLD: 0 /100 WBCS — SIGNIFICANT CHANGE UP (ref 0–0)
PHOSPHATE SERPL-MCNC: 4.2 MG/DL — SIGNIFICANT CHANGE UP (ref 2.5–4.5)
PLATELET # BLD AUTO: 177 K/UL — SIGNIFICANT CHANGE UP (ref 150–400)
POTASSIUM SERPL-MCNC: 4.7 MMOL/L — SIGNIFICANT CHANGE UP (ref 3.5–5.3)
POTASSIUM SERPL-SCNC: 4.7 MMOL/L — SIGNIFICANT CHANGE UP (ref 3.5–5.3)
RBC # BLD: 5.51 M/UL — SIGNIFICANT CHANGE UP (ref 4.2–5.8)
RBC # FLD: 12.5 % — SIGNIFICANT CHANGE UP (ref 10.3–14.5)
SODIUM SERPL-SCNC: 138 MMOL/L — SIGNIFICANT CHANGE UP (ref 135–145)
WBC # BLD: 7.74 K/UL — SIGNIFICANT CHANGE UP (ref 3.8–10.5)
WBC # FLD AUTO: 7.74 K/UL — SIGNIFICANT CHANGE UP (ref 3.8–10.5)

## 2022-08-02 PROCEDURE — 99232 SBSQ HOSP IP/OBS MODERATE 35: CPT

## 2022-08-02 RX ORDER — OXYCODONE HYDROCHLORIDE 5 MG/1
5 TABLET ORAL EVERY 4 HOURS
Refills: 0 | Status: DISCONTINUED | OUTPATIENT
Start: 2022-08-02 | End: 2022-08-03

## 2022-08-02 RX ORDER — ERYTHROMYCIN BASE 5 MG/GRAM
1 OINTMENT (GRAM) OPHTHALMIC (EYE)
Refills: 0 | Status: DISCONTINUED | OUTPATIENT
Start: 2022-08-02 | End: 2022-08-04

## 2022-08-02 RX ORDER — POLYETHYLENE GLYCOL 3350 17 G/17G
17 POWDER, FOR SOLUTION ORAL DAILY
Refills: 0 | Status: DISCONTINUED | OUTPATIENT
Start: 2022-08-02 | End: 2022-08-03

## 2022-08-02 RX ORDER — ALPRAZOLAM 0.25 MG
0.25 TABLET ORAL ONCE
Refills: 0 | Status: DISCONTINUED | OUTPATIENT
Start: 2022-08-02 | End: 2022-08-03

## 2022-08-02 RX ORDER — MORPHINE SULFATE 50 MG/1
2 CAPSULE, EXTENDED RELEASE ORAL ONCE
Refills: 0 | Status: DISCONTINUED | OUTPATIENT
Start: 2022-08-02 | End: 2022-08-02

## 2022-08-02 RX ORDER — DIPHENHYDRAMINE HCL 50 MG
25 CAPSULE ORAL EVERY 4 HOURS
Refills: 0 | Status: DISCONTINUED | OUTPATIENT
Start: 2022-08-02 | End: 2022-08-06

## 2022-08-02 RX ORDER — OXYCODONE HYDROCHLORIDE 5 MG/1
2.5 TABLET ORAL EVERY 4 HOURS
Refills: 0 | Status: DISCONTINUED | OUTPATIENT
Start: 2022-08-02 | End: 2022-08-03

## 2022-08-02 RX ORDER — SENNA PLUS 8.6 MG/1
2 TABLET ORAL AT BEDTIME
Refills: 0 | Status: DISCONTINUED | OUTPATIENT
Start: 2022-08-02 | End: 2022-08-06

## 2022-08-02 RX ADMIN — Medication 650 MILLIGRAM(S): at 05:50

## 2022-08-02 RX ADMIN — Medication 1 APPLICATION(S): at 18:35

## 2022-08-02 RX ADMIN — OXYCODONE HYDROCHLORIDE 5 MILLIGRAM(S): 5 TABLET ORAL at 22:44

## 2022-08-02 RX ADMIN — Medication 650 MILLIGRAM(S): at 21:17

## 2022-08-02 RX ADMIN — OXYCODONE HYDROCHLORIDE 5 MILLIGRAM(S): 5 TABLET ORAL at 18:56

## 2022-08-02 RX ADMIN — EMTRICITABINE AND TENOFOVIR DISOPROXIL FUMARATE 1 TABLET(S): 200; 300 TABLET, FILM COATED ORAL at 11:41

## 2022-08-02 RX ADMIN — Medication 1 APPLICATION(S): at 06:48

## 2022-08-02 RX ADMIN — TRAMADOL HYDROCHLORIDE 25 MILLIGRAM(S): 50 TABLET ORAL at 00:15

## 2022-08-02 RX ADMIN — TRAMADOL HYDROCHLORIDE 25 MILLIGRAM(S): 50 TABLET ORAL at 05:51

## 2022-08-02 RX ADMIN — Medication 600 MILLIGRAM(S): at 08:58

## 2022-08-02 RX ADMIN — OXYCODONE HYDROCHLORIDE 5 MILLIGRAM(S): 5 TABLET ORAL at 18:08

## 2022-08-02 RX ADMIN — OXYCODONE HYDROCHLORIDE 5 MILLIGRAM(S): 5 TABLET ORAL at 11:40

## 2022-08-02 RX ADMIN — MORPHINE SULFATE 2 MILLIGRAM(S): 50 CAPSULE, EXTENDED RELEASE ORAL at 00:50

## 2022-08-02 RX ADMIN — OXYCODONE HYDROCHLORIDE 5 MILLIGRAM(S): 5 TABLET ORAL at 22:14

## 2022-08-02 RX ADMIN — Medication 600 MILLIGRAM(S): at 00:15

## 2022-08-02 RX ADMIN — MUPIROCIN 1 APPLICATION(S): 20 OINTMENT TOPICAL at 18:07

## 2022-08-02 RX ADMIN — Medication 650 MILLIGRAM(S): at 06:20

## 2022-08-02 RX ADMIN — Medication 600 MILLIGRAM(S): at 09:59

## 2022-08-02 RX ADMIN — TRAMADOL HYDROCHLORIDE 25 MILLIGRAM(S): 50 TABLET ORAL at 06:50

## 2022-08-02 RX ADMIN — ONDANSETRON 4 MILLIGRAM(S): 8 TABLET, FILM COATED ORAL at 09:40

## 2022-08-02 RX ADMIN — MUPIROCIN 1 APPLICATION(S): 20 OINTMENT TOPICAL at 05:51

## 2022-08-02 RX ADMIN — OXYCODONE HYDROCHLORIDE 5 MILLIGRAM(S): 5 TABLET ORAL at 12:30

## 2022-08-02 RX ADMIN — Medication 25 MILLIGRAM(S): at 18:09

## 2022-08-02 RX ADMIN — Medication 25 MILLIGRAM(S): at 11:41

## 2022-08-02 RX ADMIN — MORPHINE SULFATE 2 MILLIGRAM(S): 50 CAPSULE, EXTENDED RELEASE ORAL at 00:34

## 2022-08-02 RX ADMIN — Medication 650 MILLIGRAM(S): at 21:47

## 2022-08-02 NOTE — PROGRESS NOTE ADULT - ASSESSMENT
31M on PREP admitted 7/31/22 with progressive lesions consistent with Monkey pox. He has developed lesions in right eyelids and progressive painful lesions at superpubic region, base of penis and shaft with increased pain. He has fever  There has been near complete resolution of COVID related symptoms - he has slight cough, no sob    Monkey PX PCR from 7/26/22 is POSITIVE confirming clinical diagnosis     He has anxiety about condition and previously expressed shame about getting this illness  Ophthalmology evaluation appreciated  - topical erythromycin ointment, artifical tears  He has continued pain, local swelling with eschar on many lesions, developed a few lesions in past day  Appears to be tolerating Tecovirimat    - I anticipate patient will have continued pain but will begin to stabilize and improve over next 2-3 days    Suggest  On TPOXX trial - Continue Tecovirimat 8/1-->  Bactroban ointment to superpubic region  pain control  -  now on oxycodone, received morphine over night    support offered  discussed with patient's mother  discussed with primary attending

## 2022-08-03 PROCEDURE — 99232 SBSQ HOSP IP/OBS MODERATE 35: CPT

## 2022-08-03 RX ORDER — CLONAZEPAM 1 MG
0.25 TABLET ORAL
Refills: 0 | Status: DISCONTINUED | OUTPATIENT
Start: 2022-08-03 | End: 2022-08-06

## 2022-08-03 RX ORDER — OXYCODONE HYDROCHLORIDE 5 MG/1
10 TABLET ORAL EVERY 4 HOURS
Refills: 0 | Status: DISCONTINUED | OUTPATIENT
Start: 2022-08-03 | End: 2022-08-03

## 2022-08-03 RX ORDER — POLYETHYLENE GLYCOL 3350 17 G/17G
17 POWDER, FOR SOLUTION ORAL
Refills: 0 | Status: DISCONTINUED | OUTPATIENT
Start: 2022-08-03 | End: 2022-08-06

## 2022-08-03 RX ORDER — MORPHINE SULFATE 50 MG/1
2 CAPSULE, EXTENDED RELEASE ORAL EVERY 4 HOURS
Refills: 0 | Status: DISCONTINUED | OUTPATIENT
Start: 2022-08-03 | End: 2022-08-05

## 2022-08-03 RX ORDER — CLONAZEPAM 1 MG
0.5 TABLET ORAL ONCE
Refills: 0 | Status: DISCONTINUED | OUTPATIENT
Start: 2022-08-03 | End: 2022-08-03

## 2022-08-03 RX ORDER — OXYCODONE AND ACETAMINOPHEN 5; 325 MG/1; MG/1
1 TABLET ORAL ONCE
Refills: 0 | Status: DISCONTINUED | OUTPATIENT
Start: 2022-08-03 | End: 2022-08-03

## 2022-08-03 RX ORDER — MORPHINE SULFATE 50 MG/1
2 CAPSULE, EXTENDED RELEASE ORAL EVERY 4 HOURS
Refills: 0 | Status: DISCONTINUED | OUTPATIENT
Start: 2022-08-03 | End: 2022-08-03

## 2022-08-03 RX ADMIN — OXYCODONE AND ACETAMINOPHEN 1 TABLET(S): 5; 325 TABLET ORAL at 05:25

## 2022-08-03 RX ADMIN — Medication 25 MILLIGRAM(S): at 22:44

## 2022-08-03 RX ADMIN — MUPIROCIN 1 APPLICATION(S): 20 OINTMENT TOPICAL at 05:25

## 2022-08-03 RX ADMIN — Medication 1 APPLICATION(S): at 17:42

## 2022-08-03 RX ADMIN — Medication 25 MILLIGRAM(S): at 09:48

## 2022-08-03 RX ADMIN — EMTRICITABINE AND TENOFOVIR DISOPROXIL FUMARATE 1 TABLET(S): 200; 300 TABLET, FILM COATED ORAL at 12:06

## 2022-08-03 RX ADMIN — OXYCODONE AND ACETAMINOPHEN 1 TABLET(S): 5; 325 TABLET ORAL at 05:55

## 2022-08-03 RX ADMIN — MORPHINE SULFATE 2 MILLIGRAM(S): 50 CAPSULE, EXTENDED RELEASE ORAL at 22:24

## 2022-08-03 RX ADMIN — Medication 600 MILLIGRAM(S): at 11:40

## 2022-08-03 RX ADMIN — Medication 5 MILLIGRAM(S): at 21:54

## 2022-08-03 RX ADMIN — MORPHINE SULFATE 2 MILLIGRAM(S): 50 CAPSULE, EXTENDED RELEASE ORAL at 15:30

## 2022-08-03 RX ADMIN — Medication 1 APPLICATION(S): at 05:26

## 2022-08-03 RX ADMIN — MORPHINE SULFATE 2 MILLIGRAM(S): 50 CAPSULE, EXTENDED RELEASE ORAL at 11:40

## 2022-08-03 RX ADMIN — POLYETHYLENE GLYCOL 3350 17 GRAM(S): 17 POWDER, FOR SOLUTION ORAL at 12:06

## 2022-08-03 RX ADMIN — Medication 600 MILLIGRAM(S): at 18:10

## 2022-08-03 RX ADMIN — MORPHINE SULFATE 2 MILLIGRAM(S): 50 CAPSULE, EXTENDED RELEASE ORAL at 21:54

## 2022-08-03 RX ADMIN — Medication 0.25 MILLIGRAM(S): at 21:53

## 2022-08-03 RX ADMIN — OXYCODONE HYDROCHLORIDE 5 MILLIGRAM(S): 5 TABLET ORAL at 10:00

## 2022-08-03 RX ADMIN — MORPHINE SULFATE 2 MILLIGRAM(S): 50 CAPSULE, EXTENDED RELEASE ORAL at 10:55

## 2022-08-03 RX ADMIN — MORPHINE SULFATE 2 MILLIGRAM(S): 50 CAPSULE, EXTENDED RELEASE ORAL at 17:42

## 2022-08-03 RX ADMIN — Medication 25 MILLIGRAM(S): at 17:42

## 2022-08-03 RX ADMIN — Medication 600 MILLIGRAM(S): at 17:43

## 2022-08-03 RX ADMIN — Medication 600 MILLIGRAM(S): at 10:55

## 2022-08-03 RX ADMIN — Medication 25 MILLIGRAM(S): at 02:21

## 2022-08-03 RX ADMIN — MORPHINE SULFATE 2 MILLIGRAM(S): 50 CAPSULE, EXTENDED RELEASE ORAL at 18:10

## 2022-08-03 RX ADMIN — MORPHINE SULFATE 2 MILLIGRAM(S): 50 CAPSULE, EXTENDED RELEASE ORAL at 14:56

## 2022-08-03 RX ADMIN — Medication 0.5 MILLIGRAM(S): at 10:56

## 2022-08-03 RX ADMIN — OXYCODONE HYDROCHLORIDE 5 MILLIGRAM(S): 5 TABLET ORAL at 09:41

## 2022-08-03 RX ADMIN — MUPIROCIN 1 APPLICATION(S): 20 OINTMENT TOPICAL at 17:42

## 2022-08-03 NOTE — PROGRESS NOTE ADULT - PROBLEM SELECTOR PLAN 1
- Per ED conversation with ID, ID is considering antiviral therapy for monkey pox given the patient's systemic symptoms  - ID consulted, will enroll in TPOXX trial. Recs appreciated  - Ophthalmology consulted for ocular involvement - erythromycin ointment BID and tears  - OP monkeypox PCR is positive  - outpatient HSV 1/2 swab on 7/26 is negative  - recent RPP and GC/ clamydia outpatient were negative  - HIV negative  - continue home PREP, Truvada  - contact and airborne precaution    Pain control:  standing morphine 2 mg iv q4 hrs, hold for respiratory depression, somnolence  morphine 2mg iv q4 PRN for breakthrough pain, hold for respiratory depression, somnolence, do not administer within 1 hr of standing dose - Per ED conversation with ID, ID is considering antiviral therapy for monkey pox given the patient's systemic symptoms  - ID consulted, will enroll in TPOXX trial. Recs appreciated  - Ophthalmology consulted for ocular involvement - erythromycin ointment BID and tears  - OP monkeypox PCR is positive  - outpatient HSV 1/2 swab on 7/26 is negative  - recent RPP and GC/ clamydia outpatient were negative  - HIV negative  - continue home PREP, Truvada  - contact and airborne precaution    Pain control:  - standing morphine 2 mg iv q4 hrs, hold for respiratory depression, somnolence  - morphine 2mg iv q4 PRN for breakthrough pain, hold for respiratory depression, somnolence, do not administer within 1 hr of standing dose  - monitor for OIC  - bowel regimen

## 2022-08-03 NOTE — PROGRESS NOTE ADULT - ASSESSMENT
31M on PREP admitted 7/31/22 with progressive lesions consistent with Monkey pox. He has developed lesions in right eyelids and progressive painful lesions at superpubic region, base of penis and shaft with increased pain. He has fever  There has been near complete resolution of COVID related symptoms - he has slight cough, no sob    Monkey PX PCR from 7/26/22 is POSITIVE confirming clinical diagnosis     He has anxiety about condition and previously expressed shame about getting this illness  Ophthalmology evaluation appreciated  - topical erythromycin ointment, artifical tears  He has continued pain, local swelling with eschar on many lesions, still with several more lesions         - tolerating Tecovirimat    - I anticipate patient will have continued pain but will begin to stabilize and improve over next 1-2 days    Suggest  On TPOXX trial - Continue Tecovirimat 8/1-->  Bactroban ointment to superpubic region  more agressive pain control would be useful    support offered

## 2022-08-04 LAB — NONVAR ORTHPX DNA SPEC QL NAA+PROBE: DETECTED

## 2022-08-04 PROCEDURE — 99233 SBSQ HOSP IP/OBS HIGH 50: CPT

## 2022-08-04 PROCEDURE — 99232 SBSQ HOSP IP/OBS MODERATE 35: CPT

## 2022-08-04 RX ORDER — HYDROCORTISONE 1 %
1 OINTMENT (GRAM) TOPICAL
Refills: 0 | Status: DISCONTINUED | OUTPATIENT
Start: 2022-08-04 | End: 2022-08-06

## 2022-08-04 RX ORDER — ERYTHROMYCIN BASE 5 MG/GRAM
1 OINTMENT (GRAM) OPHTHALMIC (EYE)
Refills: 0 | Status: DISCONTINUED | OUTPATIENT
Start: 2022-08-04 | End: 2022-08-06

## 2022-08-04 RX ORDER — OXYCODONE HYDROCHLORIDE 5 MG/1
2.5 TABLET ORAL ONCE
Refills: 0 | Status: DISCONTINUED | OUTPATIENT
Start: 2022-08-04 | End: 2022-08-04

## 2022-08-04 RX ORDER — CHLORHEXIDINE GLUCONATE 213 G/1000ML
1 SOLUTION TOPICAL DAILY
Refills: 0 | Status: DISCONTINUED | OUTPATIENT
Start: 2022-08-04 | End: 2022-08-06

## 2022-08-04 RX ADMIN — Medication 1 DROP(S): at 21:53

## 2022-08-04 RX ADMIN — OXYCODONE HYDROCHLORIDE 2.5 MILLIGRAM(S): 5 TABLET ORAL at 22:51

## 2022-08-04 RX ADMIN — MORPHINE SULFATE 2 MILLIGRAM(S): 50 CAPSULE, EXTENDED RELEASE ORAL at 20:58

## 2022-08-04 RX ADMIN — MORPHINE SULFATE 2 MILLIGRAM(S): 50 CAPSULE, EXTENDED RELEASE ORAL at 16:08

## 2022-08-04 RX ADMIN — MORPHINE SULFATE 2 MILLIGRAM(S): 50 CAPSULE, EXTENDED RELEASE ORAL at 06:31

## 2022-08-04 RX ADMIN — Medication 1 APPLICATION(S): at 17:56

## 2022-08-04 RX ADMIN — Medication 1 DROP(S): at 17:54

## 2022-08-04 RX ADMIN — SENNA PLUS 2 TABLET(S): 8.6 TABLET ORAL at 21:52

## 2022-08-04 RX ADMIN — MORPHINE SULFATE 2 MILLIGRAM(S): 50 CAPSULE, EXTENDED RELEASE ORAL at 20:28

## 2022-08-04 RX ADMIN — Medication 650 MILLIGRAM(S): at 09:36

## 2022-08-04 RX ADMIN — MORPHINE SULFATE 2 MILLIGRAM(S): 50 CAPSULE, EXTENDED RELEASE ORAL at 11:37

## 2022-08-04 RX ADMIN — Medication 5 MILLIGRAM(S): at 21:53

## 2022-08-04 RX ADMIN — Medication 650 MILLIGRAM(S): at 10:20

## 2022-08-04 RX ADMIN — MORPHINE SULFATE 2 MILLIGRAM(S): 50 CAPSULE, EXTENDED RELEASE ORAL at 02:42

## 2022-08-04 RX ADMIN — MORPHINE SULFATE 2 MILLIGRAM(S): 50 CAPSULE, EXTENDED RELEASE ORAL at 07:30

## 2022-08-04 RX ADMIN — OXYCODONE HYDROCHLORIDE 2.5 MILLIGRAM(S): 5 TABLET ORAL at 23:21

## 2022-08-04 RX ADMIN — MUPIROCIN 1 APPLICATION(S): 20 OINTMENT TOPICAL at 17:55

## 2022-08-04 RX ADMIN — Medication 1 APPLICATION(S): at 15:51

## 2022-08-04 RX ADMIN — MORPHINE SULFATE 2 MILLIGRAM(S): 50 CAPSULE, EXTENDED RELEASE ORAL at 11:01

## 2022-08-04 RX ADMIN — Medication 1 DROP(S): at 13:07

## 2022-08-04 RX ADMIN — Medication 1 APPLICATION(S): at 06:32

## 2022-08-04 RX ADMIN — Medication 25 MILLIGRAM(S): at 09:37

## 2022-08-04 RX ADMIN — MORPHINE SULFATE 2 MILLIGRAM(S): 50 CAPSULE, EXTENDED RELEASE ORAL at 12:05

## 2022-08-04 RX ADMIN — EMTRICITABINE AND TENOFOVIR DISOPROXIL FUMARATE 1 TABLET(S): 200; 300 TABLET, FILM COATED ORAL at 11:01

## 2022-08-04 RX ADMIN — MORPHINE SULFATE 2 MILLIGRAM(S): 50 CAPSULE, EXTENDED RELEASE ORAL at 11:30

## 2022-08-04 RX ADMIN — MORPHINE SULFATE 2 MILLIGRAM(S): 50 CAPSULE, EXTENDED RELEASE ORAL at 15:51

## 2022-08-04 RX ADMIN — POLYETHYLENE GLYCOL 3350 17 GRAM(S): 17 POWDER, FOR SOLUTION ORAL at 06:32

## 2022-08-04 RX ADMIN — MORPHINE SULFATE 2 MILLIGRAM(S): 50 CAPSULE, EXTENDED RELEASE ORAL at 02:12

## 2022-08-04 RX ADMIN — MUPIROCIN 1 APPLICATION(S): 20 OINTMENT TOPICAL at 06:35

## 2022-08-04 RX ADMIN — Medication 1 APPLICATION(S): at 11:10

## 2022-08-04 RX ADMIN — MORPHINE SULFATE 2 MILLIGRAM(S): 50 CAPSULE, EXTENDED RELEASE ORAL at 17:52

## 2022-08-04 RX ADMIN — Medication 0.25 MILLIGRAM(S): at 11:53

## 2022-08-04 RX ADMIN — MORPHINE SULFATE 2 MILLIGRAM(S): 50 CAPSULE, EXTENDED RELEASE ORAL at 18:10

## 2022-08-04 NOTE — PROGRESS NOTE ADULT - ASSESSMENT
31M on PREP admitted 7/31/22 with progressive lesions consistent with Monkey pox. He has developed lesions in right eyelids and progressive painful lesions at superpubic region, base of penis and shaft with increased pain. He has fever  There has been near complete resolution of COVID related symptoms - he has slight cough, no sob    Monkey PX PCR from 7/26/22 and 8/1/22  is POSITIVE confirming clinical diagnosis    Patient upset with acute pain from adherent dressing pulling off eschars  Pain control was improved prior to this acute incident  IV being replaced  discussed with nursing staff    I believe patient is improving: no new lesions, eschars coming off due in part to epithelielization of underlying pox  Tolerating Tecovirimat    Suggest  On TPOXX trial - Continue Tecovirimat 8/1-->    14 day course anticipated  Bactroban ointment to superpubic region  Continue analgesics, anxiolytics    support offered

## 2022-08-04 NOTE — PROGRESS NOTE ADULT - ASSESSMENT
31M MSM on on Truvada for PREP, p/w fevers and chills, positive for COVID infection and now confirmed monkeypox infection

## 2022-08-04 NOTE — PROGRESS NOTE ADULT - PROBLEM SELECTOR PLAN 1
Monkeypox PCR is positive. Outpatient HSV 1/2 swab negative on 7/26. Recent RPR and GC/CH negative. HIV negative. Repeat swab while inpatient positive.   - ID consulted, will enroll in TPOXX trial. Ongoing recommendations appreciated  - Ophthalmology consulted for ocular involvement - erythromycin ointment BID and tears  - continue home PREP, Truvada  - contact and airborne precaution    Pain control:  - standing morphine 2 mg iv q4 hrs, hold for respiratory depression, somnolence  - morphine 2mg iv q4 PRN for breakthrough pain, hold for respiratory depression, somnolence, do not administer within 1 hr of standing dose  - patient has not had bowel movement in 2 days, likely secondary to limited mobility in setting of pain in addition to opioid dosing, will start bowel regimen today

## 2022-08-05 PROCEDURE — 99232 SBSQ HOSP IP/OBS MODERATE 35: CPT

## 2022-08-05 RX ORDER — MORPHINE SULFATE 50 MG/1
2 CAPSULE, EXTENDED RELEASE ORAL EVERY 4 HOURS
Refills: 0 | Status: DISCONTINUED | OUTPATIENT
Start: 2022-08-05 | End: 2022-08-06

## 2022-08-05 RX ORDER — OXYCODONE HYDROCHLORIDE 5 MG/1
2.5 TABLET ORAL EVERY 4 HOURS
Refills: 0 | Status: DISCONTINUED | OUTPATIENT
Start: 2022-08-05 | End: 2022-08-06

## 2022-08-05 RX ORDER — SODIUM CHLORIDE 9 MG/ML
1000 INJECTION INTRAMUSCULAR; INTRAVENOUS; SUBCUTANEOUS
Refills: 0 | Status: DISCONTINUED | OUTPATIENT
Start: 2022-08-05 | End: 2022-08-06

## 2022-08-05 RX ORDER — MORPHINE SULFATE 50 MG/1
2 CAPSULE, EXTENDED RELEASE ORAL ONCE
Refills: 0 | Status: DISCONTINUED | OUTPATIENT
Start: 2022-08-05 | End: 2022-08-05

## 2022-08-05 RX ADMIN — Medication 3 MILLIGRAM(S): at 22:41

## 2022-08-05 RX ADMIN — Medication 1 DROP(S): at 12:16

## 2022-08-05 RX ADMIN — MORPHINE SULFATE 2 MILLIGRAM(S): 50 CAPSULE, EXTENDED RELEASE ORAL at 18:16

## 2022-08-05 RX ADMIN — Medication 600 MILLIGRAM(S): at 09:26

## 2022-08-05 RX ADMIN — MORPHINE SULFATE 2 MILLIGRAM(S): 50 CAPSULE, EXTENDED RELEASE ORAL at 22:41

## 2022-08-05 RX ADMIN — MORPHINE SULFATE 2 MILLIGRAM(S): 50 CAPSULE, EXTENDED RELEASE ORAL at 01:00

## 2022-08-05 RX ADMIN — MORPHINE SULFATE 2 MILLIGRAM(S): 50 CAPSULE, EXTENDED RELEASE ORAL at 05:02

## 2022-08-05 RX ADMIN — MORPHINE SULFATE 2 MILLIGRAM(S): 50 CAPSULE, EXTENDED RELEASE ORAL at 09:10

## 2022-08-05 RX ADMIN — Medication 1 DROP(S): at 02:31

## 2022-08-05 RX ADMIN — OXYCODONE HYDROCHLORIDE 2.5 MILLIGRAM(S): 5 TABLET ORAL at 16:10

## 2022-08-05 RX ADMIN — MORPHINE SULFATE 2 MILLIGRAM(S): 50 CAPSULE, EXTENDED RELEASE ORAL at 09:24

## 2022-08-05 RX ADMIN — MORPHINE SULFATE 2 MILLIGRAM(S): 50 CAPSULE, EXTENDED RELEASE ORAL at 13:56

## 2022-08-05 RX ADMIN — OXYCODONE HYDROCHLORIDE 2.5 MILLIGRAM(S): 5 TABLET ORAL at 21:05

## 2022-08-05 RX ADMIN — MORPHINE SULFATE 2 MILLIGRAM(S): 50 CAPSULE, EXTENDED RELEASE ORAL at 13:38

## 2022-08-05 RX ADMIN — ONDANSETRON 4 MILLIGRAM(S): 8 TABLET, FILM COATED ORAL at 05:01

## 2022-08-05 RX ADMIN — EMTRICITABINE AND TENOFOVIR DISOPROXIL FUMARATE 1 TABLET(S): 200; 300 TABLET, FILM COATED ORAL at 09:27

## 2022-08-05 RX ADMIN — Medication 1 APPLICATION(S): at 00:32

## 2022-08-05 RX ADMIN — Medication 25 MILLIGRAM(S): at 16:11

## 2022-08-05 RX ADMIN — Medication 650 MILLIGRAM(S): at 09:10

## 2022-08-05 RX ADMIN — MORPHINE SULFATE 2 MILLIGRAM(S): 50 CAPSULE, EXTENDED RELEASE ORAL at 12:31

## 2022-08-05 RX ADMIN — Medication 600 MILLIGRAM(S): at 09:10

## 2022-08-05 RX ADMIN — Medication 1 DROP(S): at 18:05

## 2022-08-05 RX ADMIN — Medication 1 DROP(S): at 06:18

## 2022-08-05 RX ADMIN — OXYCODONE HYDROCHLORIDE 2.5 MILLIGRAM(S): 5 TABLET ORAL at 15:55

## 2022-08-05 RX ADMIN — Medication 0.25 MILLIGRAM(S): at 10:12

## 2022-08-05 RX ADMIN — MORPHINE SULFATE 2 MILLIGRAM(S): 50 CAPSULE, EXTENDED RELEASE ORAL at 18:04

## 2022-08-05 RX ADMIN — CHLORHEXIDINE GLUCONATE 1 APPLICATION(S): 213 SOLUTION TOPICAL at 12:16

## 2022-08-05 RX ADMIN — SODIUM CHLORIDE 75 MILLILITER(S): 9 INJECTION INTRAMUSCULAR; INTRAVENOUS; SUBCUTANEOUS at 20:35

## 2022-08-05 RX ADMIN — Medication 600 MILLIGRAM(S): at 13:56

## 2022-08-05 RX ADMIN — MORPHINE SULFATE 2 MILLIGRAM(S): 50 CAPSULE, EXTENDED RELEASE ORAL at 00:30

## 2022-08-05 RX ADMIN — MUPIROCIN 1 APPLICATION(S): 20 OINTMENT TOPICAL at 18:04

## 2022-08-05 RX ADMIN — MUPIROCIN 1 APPLICATION(S): 20 OINTMENT TOPICAL at 06:18

## 2022-08-05 RX ADMIN — MORPHINE SULFATE 2 MILLIGRAM(S): 50 CAPSULE, EXTENDED RELEASE ORAL at 05:32

## 2022-08-05 RX ADMIN — SENNA PLUS 2 TABLET(S): 8.6 TABLET ORAL at 22:42

## 2022-08-05 RX ADMIN — Medication 600 MILLIGRAM(S): at 13:39

## 2022-08-05 RX ADMIN — OXYCODONE HYDROCHLORIDE 2.5 MILLIGRAM(S): 5 TABLET ORAL at 20:35

## 2022-08-05 RX ADMIN — Medication 1 APPLICATION(S): at 18:04

## 2022-08-05 RX ADMIN — Medication 650 MILLIGRAM(S): at 09:24

## 2022-08-05 RX ADMIN — MORPHINE SULFATE 2 MILLIGRAM(S): 50 CAPSULE, EXTENDED RELEASE ORAL at 12:16

## 2022-08-05 RX ADMIN — MORPHINE SULFATE 2 MILLIGRAM(S): 50 CAPSULE, EXTENDED RELEASE ORAL at 23:00

## 2022-08-05 RX ADMIN — Medication 5 MILLIGRAM(S): at 22:41

## 2022-08-05 RX ADMIN — Medication 1 APPLICATION(S): at 23:38

## 2022-08-05 RX ADMIN — Medication 1 DROP(S): at 09:10

## 2022-08-05 RX ADMIN — Medication 1 APPLICATION(S): at 06:18

## 2022-08-05 RX ADMIN — Medication 1 DROP(S): at 22:42

## 2022-08-05 RX ADMIN — Medication 1 APPLICATION(S): at 12:16

## 2022-08-05 NOTE — PROGRESS NOTE ADULT - PROBLEM SELECTOR PLAN 1
Monkeypox PCR is positive. Outpatient HSV 1/2 swab negative on 7/26. Recent RPR and GC/CH negative. HIV negative. Repeat swab while inpatient positive.   - ID consulted, will enroll in TPOXX trial. Ongoing recommendations appreciated, plan is for full 14 day course with last dose to be given on 8/15  - Ophthalmology consulted for ocular involvement - erythromycin ointment BID and tears  - continue home PREP, Truvada  - contact and airborne precaution    Pain control:  - standing morphine 2 mg iv q4 hrs, hold for respiratory depression, somnolence and morphine 2mg iv q4 PRN for breakthrough pain  - plan is to transition to oxycodone IR 2.5 mg q4hrs standing tomorrow with PRN for breakthrough pain, may uptitrate to 5 mg PO q6hrs if dosing is insufficient to control pain  - patient was with bowel movement yesterday, no OIC on current medication regimen

## 2022-08-05 NOTE — CHART NOTE - NSCHARTNOTEFT_GEN_A_CORE
Wound Care Team Note:    Request for wound care consult for patient with suspected Monkeypox lesions on the pubis and genitals. Please refer consult request to Dermatology. Discussed with ACP of primary team. Will not follow.    LARISSA YadavC, CWOCN [id number]

## 2022-08-05 NOTE — PROVIDER CONTACT NOTE (OTHER) - ASSESSMENT
Pt is requesting oxycodone for pain. He stated, "I was getting oxycodone for pain and it was helping me in between the morphine doses. It helped me relax and took away a lot of the pain." RN brought this concern to Pt is requesting oxycodone for pain. He stated, "I was getting oxycodone for pain and it was helping me in between the morphine doses. It helped me relax and took away a lot of the pain." RN brought this concern to WILLIAM Jung.

## 2022-08-05 NOTE — PROGRESS NOTE ADULT - ASSESSMENT
31M MSM on on Truvada for PREP, p/w fevers and chills, positive for COVID infection and now confirmed monkeypox infection, remains inpatient due to pain medication requirements

## 2022-08-05 NOTE — PROGRESS NOTE ADULT - ASSESSMENT
31M on PREP admitted 7/31/22 with progressive lesions consistent with Monkey pox. He has developed lesions in right eyelids and progressive painful lesions at superpubic region, base of penis and shaft with increased pain. He has fever  There has been near complete resolution of COVID related symptoms - he has slight cough, no sob    Monkeypox PCR from 7/26/22 and 8/1/22  is POSITIVE confirming clinical diagnosis      improvement evident as of 8/4   3rd day of Tecovirmat, though the severe pain with eschar removal was intense 8/4  sustained improvement 8/5  Tolerating Tecovirimat    Suggest  On TPOXX trial - Continue Tecovirimat 8/1-->    x14 day course   Bactroban ointment to superpubic region    I am hopeful that pain will continue to improve and that he will be able to be discharged in 2-3 days - I will follow over weekend

## 2022-08-05 NOTE — PROGRESS NOTE ADULT - ASSESSMENT
31y male w/ no pmhx/ochx, hx unprotected sexual intercourse with males, with progressive skin lesions on penis, back, hands c/w monkeypox, consulted for r/o ocular involvement of monkeypox, found to have one raised umbilicated lesion of lid RUL, one on RLL palpebral conj, one on R caruncle c/w ocular involvement of monkeypox. Lesions now improving/resolving.     1. Ocular involvement of monkeypox  - RLL palpebral conj lesion resolved. RUL lid margin lesion now 3mm and red. Caruncle lesion resolved  - Images of penile lesions reviewed, lesions on conj appear very similar.   - C/w TPOXX trial per ID  - Corneas are clear, DFE wnl  - Monkeypox PCR positive  - C/w erythromycin ointment QID, PFAT Q4H OD  - will follow, pt advised to let team know if he has any new or worsening eye complaints    Outpatient follow-up: Patient should follow-up with his/her ophthalmologist or with [facility] Department of Ophthalmology at the address below within 1 week of discharge, sooner if symptoms worsen or change    [address] [place] [address]. [address]  [place] [address]  [phone]    Case discussed with Dr. Suh, attending.     Viviana CHOI Rai, MD  PGY-3, Ophthalmology  [phone]    Also available on [employer] Teams. 31y male w/ no pmhx/ochx, hx unprotected sexual intercourse with males, with progressive skin lesions on penis, back, hands c/w monkeypox, consulted for r/o ocular involvement of monkeypox, found to have one raised umbilicated lesion of lid RUL, one on RLL palpebral conj, one on R caruncle c/w ocular involvement of monkeypox. Lesions now improving/resolving.     1. Ocular involvement of monkeypox  - RLL palpebral conj lesion resolved. RUL lid margin lesion now 3mm and red. Caruncle lesion resolved  - Pt reports total resolution of eye discomfort.   - Images of penile lesions reviewed, lesions on conj appear very similar.   - C/w TPOXX trial per ID  - Corneas are clear, DFE wnl  - Monkeypox PCR positive  - C/w erythromycin ointment QID, PFAT Q4H OD  - will follow, pt advised to let team know if he has any new or worsening eye complaints    Outpatient follow-up: Patient should follow-up with his/her ophthalmologist or with [facility] Department of Ophthalmology at the address below within 1 week of discharge, sooner if symptoms worsen or change    [address]. [address]  [place] [address]  [phone]    Case discussed with Dr. Suh, attending.     Viviana CHOI Rai, MD  PGY-3, Ophthalmology  [phone]    Also available on [employer] Teams. 31y male w/ no pmhx/ochx, hx unprotected sexual intercourse with males, with progressive skin lesions on penis, back, hands c/w monkeypox, consulted for r/o ocular involvement of monkeypox, found to have one raised umbilicated lesion of lid RUL, one on RLL palpebral conj, one on R caruncle c/w ocular involvement of monkeypox. Lesions now improving/resolving.     1. Ocular involvement of monkeypox  - RLL palpebral conj lesion resolved. RUL lid margin lesion now 3mm and red. Caruncle lesion resolved  - Pt reports total resolution of eye discomfort.   - Images of penile lesions reviewed, lesions on conj appear very similar.   - C/w TPOXX trial per ID  - Corneas are clear, DFE wnl  - Monkeypox PCR positive  - C/w erythromycin ointment QID, PFAT Q4H OD  - will follow, pt advised to let team know if he has any new or worsening eye complaints    Outpatient follow-up: Patient should follow-up with his/her ophthalmologist or with [facility] Department of Ophthalmology at the address below within 1 week of discharge, sooner if symptoms worsen or change    [address]  [place] [address]  [phone]    Case discussed with Dr. Suh, attending.     Viviana CHOI Rai, MD  PGY-3, Ophthalmology  [phone]    Also available on [employer] Teams.    I have discussed the patient with the resident and reviewed the residents note including the history, exam, assessment, and plan.  I agree with the residents assessment and plan.    Magui Suh MD

## 2022-08-05 NOTE — PROVIDER CONTACT NOTE (OTHER) - ACTION/TREATMENT ORDERED:
As per , oxycodone 2.5mg PO STAT x1, stop after 1 dose. No further interventions ordered at this time. Will continue to monitor. As per WILLIAM Jung, oxycodone 2.5mg PO STAT x1, stop after 1 dose. No further interventions ordered at this time. Will continue to monitor.

## 2022-08-06 ENCOUNTER — TRANSCRIPTION ENCOUNTER (OUTPATIENT)
Age: 32
End: 2022-08-06

## 2022-08-06 VITALS
DIASTOLIC BLOOD PRESSURE: 83 MMHG | OXYGEN SATURATION: 99 % | TEMPERATURE: 98 F | SYSTOLIC BLOOD PRESSURE: 144 MMHG | HEART RATE: 64 BPM | RESPIRATION RATE: 18 BRPM

## 2022-08-06 DIAGNOSIS — B04 MONKEYPOX: ICD-10-CM

## 2022-08-06 LAB
RAPID RVP RESULT: SIGNIFICANT CHANGE UP
SARS-COV-2 RNA SPEC QL NAA+PROBE: SIGNIFICANT CHANGE UP

## 2022-08-06 PROCEDURE — 99232 SBSQ HOSP IP/OBS MODERATE 35: CPT

## 2022-08-06 RX ORDER — OXYCODONE HYDROCHLORIDE 5 MG/1
0.5 TABLET ORAL
Qty: 15 | Refills: 0
Start: 2022-08-06 | End: 2022-08-10

## 2022-08-06 RX ORDER — EMTRICITABINE AND TENOFOVIR DISOPROXIL FUMARATE 200; 300 MG/1; MG/1
1 TABLET, FILM COATED ORAL
Qty: 0 | Refills: 0 | DISCHARGE

## 2022-08-06 RX ORDER — SENNA PLUS 8.6 MG/1
2 TABLET ORAL
Qty: 60 | Refills: 0
Start: 2022-08-06 | End: 2022-09-04

## 2022-08-06 RX ORDER — ERYTHROMYCIN BASE 5 MG/GRAM
1 OINTMENT (GRAM) OPHTHALMIC (EYE)
Qty: 120 | Refills: 0
Start: 2022-08-06 | End: 2022-09-04

## 2022-08-06 RX ORDER — MUPIROCIN 20 MG/G
1 OINTMENT TOPICAL
Qty: 60 | Refills: 0
Start: 2022-08-06 | End: 2022-09-04

## 2022-08-06 RX ORDER — HYDROCORTISONE 1 %
1 OINTMENT (GRAM) TOPICAL
Qty: 60 | Refills: 0
Start: 2022-08-06 | End: 2022-09-04

## 2022-08-06 RX ORDER — EMTRICITABINE AND TENOFOVIR DISOPROXIL FUMARATE 200; 300 MG/1; MG/1
1 TABLET, FILM COATED ORAL
Qty: 0 | Refills: 0 | DISCHARGE
Start: 2022-08-06

## 2022-08-06 RX ADMIN — OXYCODONE HYDROCHLORIDE 2.5 MILLIGRAM(S): 5 TABLET ORAL at 10:16

## 2022-08-06 RX ADMIN — Medication 1 APPLICATION(S): at 11:06

## 2022-08-06 RX ADMIN — Medication 1 DROP(S): at 04:33

## 2022-08-06 RX ADMIN — Medication 1 APPLICATION(S): at 04:32

## 2022-08-06 RX ADMIN — OXYCODONE HYDROCHLORIDE 2.5 MILLIGRAM(S): 5 TABLET ORAL at 05:02

## 2022-08-06 RX ADMIN — Medication 1 DROP(S): at 13:03

## 2022-08-06 RX ADMIN — CHLORHEXIDINE GLUCONATE 1 APPLICATION(S): 213 SOLUTION TOPICAL at 11:06

## 2022-08-06 RX ADMIN — EMTRICITABINE AND TENOFOVIR DISOPROXIL FUMARATE 1 TABLET(S): 200; 300 TABLET, FILM COATED ORAL at 11:05

## 2022-08-06 RX ADMIN — OXYCODONE HYDROCHLORIDE 2.5 MILLIGRAM(S): 5 TABLET ORAL at 12:15

## 2022-08-06 RX ADMIN — POLYETHYLENE GLYCOL 3350 17 GRAM(S): 17 POWDER, FOR SOLUTION ORAL at 04:32

## 2022-08-06 RX ADMIN — OXYCODONE HYDROCHLORIDE 2.5 MILLIGRAM(S): 5 TABLET ORAL at 15:32

## 2022-08-06 RX ADMIN — OXYCODONE HYDROCHLORIDE 2.5 MILLIGRAM(S): 5 TABLET ORAL at 04:32

## 2022-08-06 RX ADMIN — Medication 1 DROP(S): at 10:11

## 2022-08-06 RX ADMIN — MUPIROCIN 1 APPLICATION(S): 20 OINTMENT TOPICAL at 04:38

## 2022-08-06 NOTE — PROGRESS NOTE ADULT - REASON FOR ADMISSION
concern for monkeypox
monkeypox
concern for monkeypox
monkeypox and COVID

## 2022-08-06 NOTE — PROGRESS NOTE ADULT - PROBLEM SELECTOR PLAN 2
tested positive about one week ago  - with mild symptoms  - initial symptoms of cough and nasal congestion are improved  - continue symptomatic treatment
tested positive about one week ago with mild symptoms. Initial symptoms of cough and nasal congestion are improved  - continue symptomatic treatment  - no hypoxia or indications for systemic steroids or other therapies at this time  - per patient request, will check COVID test prior to d/c, he is aware if could be persistently positive
tested positive about one week ago with mild symptoms  - initial symptoms of cough and nasal congestion are improved  - continue symptomatic treatment  - no hypoxia or indications for systemic steroids or other therapies at this time
tested positive about one week ago with mild symptoms. Initial symptoms of cough and nasal congestion are improved  - continue symptomatic treatment  - no hypoxia or indications for systemic steroids or other therapies at this time
tested positive about one week ago  - with mild symptoms  - initial symptoms of cough and nasal congestion are improved  - continue symptomatic treatment
tested positive about one week ago  - with mild symptoms  - initial symptoms of cough and nasal congestion are improved  - continue symptomatic treatment

## 2022-08-06 NOTE — PROGRESS NOTE ADULT - PROBLEM SELECTOR PLAN 1
Monkeypox PCR is positive. Outpatient HSV 1/2 swab negative on 7/26. Recent RPR and GC/CH negative. HIV negative. Repeat swab while inpatient positive.   - ID consulted, will enroll in TPOXX trial. Ongoing recommendations appreciated, plan is for full 14 day course with last dose to be given on 8/15, to d/c with remaining course  - Ophthalmology consulted for ocular involvement - erythromycin ointment BID and tears  - continue home PREP, Truvada  - contact and airborne precaution    Pain control:  - used oxy PRN x 4 and morphine PRN x 3/24 hours  - d/c on oxycodone IR 2.5 mg q4hrs PRN for breakthrough pain  - patient was with bowel movement 8/5, no OIC on current medication regimen

## 2022-08-06 NOTE — DISCHARGE NOTE PROVIDER - CARE PROVIDER_API CALL
Matthew Goss)  Internal Medicine  [address] [place] [address]  Phone: [phone]  Fax: [phone]  Follow Up Time:

## 2022-08-06 NOTE — PROGRESS NOTE ADULT - PROBLEM SELECTOR PLAN 3
- DVT ppx: ambulate  - GI ppx: none  - Diet: regular
- DVT ppx: IMPROVE score 0  - GI ppx: none  - Diet: regular    #####  Case discussed with ACP Justine Bautista  #####
- DVT ppx: IMPROVE score 0  - GI ppx: none  - Diet: regular    #####  Case discussed with ACP Justine Bautista  #####
- DVT ppx: IMPROVE score 0  - GI ppx: none  - Diet: regular  - Dispo: d/c home today  ______________  Alex Sotelo MD  [facility]  [phone]
- DVT ppx: ambulate  - GI ppx: none  - Diet: regular
- DVT ppx: ambulate  - GI ppx: none  - Diet: regular

## 2022-08-06 NOTE — DISCHARGE NOTE NURSING/CASE MANAGEMENT/SOCIAL WORK - PATIENT PORTAL LINK FT
You can access the FollowMyHealth Patient Portal offered by [facility] by registering at the following website: [email or web address]. By joining [facility]’s FollowMyHealth portal, you will also be able to view your health information using other applications (apps) compatible with our system.

## 2022-08-06 NOTE — DISCHARGE NOTE PROVIDER - NSDCFUADDAPPT_GEN_ALL_CORE_FT
Follow up with Dr. Goss as an outpatient.     Please take your medications as ordered.     Patient should follow-up with his/her ophthalmologist or with [facility] Department of Ophthalmology at the address below within 1 week of discharge, sooner if symptoms worsen or change  [address]. [address]  [place] [address]  [phone]

## 2022-08-06 NOTE — DISCHARGE NOTE PROVIDER - CARE PROVIDERS DIRECT ADDRESSES
[email or web address]

## 2022-08-06 NOTE — DISCHARGE NOTE PROVIDER - NSDCCPCAREPLAN_GEN_ALL_CORE_FT
PRINCIPAL DISCHARGE DIAGNOSIS  Diagnosis: Monkeypox  Assessment and Plan of Treatment: Please take  your medication TPOXX as ordered.  Follow up with your Infectious Disease Doctor as an outpatient.  Follow up with Opthalomologist for  your Eyes.      SECONDARY DISCHARGE DIAGNOSES  Diagnosis: 2019 novel coronavirus disease (COVID-19)  Assessment and Plan of Treatment: You tested positive for COVID 19.  You no longer require hospitalization.  Please restrict activities outside of your home except for getting medical care.  Do not go to work, school, or public areas.  Avoid using public transportation, ride-sharing, or taxis.  Separate yourself from other people and animals in your home.  Call ahead before visiting your doctor.  Wear a facemask when you are around other people. Cover your cough and sneezes.  Clean your hands often.  Avoid sharing personal household items.  Clean all frequently touched surfaces daily.

## 2022-08-06 NOTE — PROGRESS NOTE ADULT - PROVIDER SPECIALTY LIST ADULT
Infectious Disease
Internal Medicine
Ophthalmology
Infectious Disease
Internal Medicine
Internal Medicine
Hospitalist
Hospitalist
Internal Medicine

## 2022-08-06 NOTE — DISCHARGE NOTE NURSING/CASE MANAGEMENT/SOCIAL WORK - NSDCFUADDAPPT_GEN_ALL_CORE_FT
Follow up with Dr. Goss as an outpatient.     Please take your medications as ordered.     Patient should follow-up with his/her ophthalmologist or with [facility] Department of Ophthalmology at the address below within 1 week of discharge, sooner if symptoms worsen or change  [address] [facility]  [place] [address]  [phone]

## 2022-08-06 NOTE — PROGRESS NOTE ADULT - SUBJECTIVE AND OBJECTIVE BOX
Follow Up:  monkeypox    Interval History/ROS: - in pain, anxious, afraid. Notes several additional lesions on trunk and back. Lesion on left index finger with greater swelling, local fullness. He has marked pain on penis and superpubic region  taking po  no visual disturbance, no sob  -He has contacted his 4 sexual contacts over the past month- none are ill with either monkeypox or covid - (1 individual got vaccinated against monkeypox)    Allergies  Clindamycin Phosphate (Unknown)  Neosporin (Rash)  penicillin G potassium (Rash)        ANTIMICROBIALS:  emtricitabine 200 mG/tenofovir 300 mG (TRUVADA) 1 daily  tecovirimat Capsule 600 two times a day after meals      OTHER MEDS:  MEDICATIONS  (STANDING):  acetaminophen     Tablet .. 650 every 6 hours PRN  aluminum hydroxide/magnesium hydroxide/simethicone Suspension 30 every 4 hours PRN  diphenhydrAMINE 25 every 4 hours PRN  ibuprofen  Tablet. 600 three times a day PRN  melatonin 3 at bedtime PRN  ondansetron Injectable 4 every 8 hours PRN  oxyCODONE    IR 5 every 4 hours PRN  oxyCODONE    IR 2.5 every 4 hours PRN  polyethylene glycol 3350 17 daily  senna 2 at bedtime    Vital Signs Last 24 Hrs  T(C): 36.6 (02 Aug 2022 11:56), Max: 37.1 (02 Aug 2022 06:48)  T(F): 97.9 (02 Aug 2022 11:56), Max: 98.8 (02 Aug 2022 06:48)  HR: 58 (02 Aug 2022 11:56) (53 - 62)  BP: 126/75 (02 Aug 2022 11:56) (126/75 - 148/84)  BP(mean): --  RR: 18 (02 Aug 2022 11:56) (18 - 18)  SpO2: 99% (02 Aug 2022 11:56) (98% - 99%)    Parameters below as of 02 Aug 2022 11:56  Patient On (Oxygen Delivery Method): room air        PHYSICAL EXAM:  General: anxious ill appearing  EYE- conjunctivitival injection OD  Neurology: A&Ox3, nonfocal  Respiratory: Clear to auscultation bilaterally  CV: RRR, S1S2, no murmurs, rubs or gallops  Abdominal: Soft, Non-tender, non-distended, normal bowel sounds  Extremities: No edema, + peripheral pulses  Line Sites: Clear  Skin: dense confluent lesions with eschar on penis and superpubic region - local swelling with dull underlying reactive erythema  - 10 lesions on trunk, UE left neck                        15.3   7.74  )-----------( 177      ( 02 Aug 2022 07:12 )             47.0     08-02    138  |  103  |  9   ----------------------------<  98  4.7   |  27  |  0.96    Ca    9.0      02 Aug 2022 07:16  Phos  4.2     08-02  Mg     1.9     08-02    TPro  7.6  /  Alb  4.2  /  TBili  0.4  /  DBili  x   /  AST  24  /  ALT  31  /  AlkPhos  94  07-31          Matthew Goss MD; Division of Infectious Disease; Pager: [phone]; nights and weekends: [phone]
[facility] Division of Hospital Medicine  Rob Phipps MD  Available via MS Teams  Pager [phone]    SUBJECTIVE / OVERNIGHT EVENTS: No acute events reported overnight    ADDITIONAL REVIEW OF SYSTEMS: Patient is reporting continued pain. He reports his pain is controlled with current opioid therapy however he is still with 10/10 breakthrough pain whenever changing dressing or moving in specific directions. Patient reports he still cannot sit upright with his legs down due to pain caused by lesions in his groin. Other than acute pain, patient is without complaint. Reports he had a bowel movement yesterday. Reports he has no chest pain or shortness of breath. Denies diarrhea. Denies nausea or vomiting. Denies subjective fever. ROS otherwise negative.     MEDICATIONS  (STANDING):  artificial tears (preservative free) Ophthalmic Solution 1 Drop(s) Right EYE every 4 hours  bisacodyl 5 milliGRAM(s) Oral at bedtime  chlorhexidine 2% Cloths 1 Application(s) Topical daily  emtricitabine 200 mG/tenofovir 300 mG (TRUVADA) 1 Tablet(s) Oral daily  erythromycin   Ointment 1 Application(s) Right EYE four times a day  mupirocin 2% Ointment 1 Application(s) Topical two times a day  polyethylene glycol 3350 17 Gram(s) Oral two times a day  senna 2 Tablet(s) Oral at bedtime  sodium chloride 0.9%. 1000 milliLiter(s) (75 mL/Hr) IV Continuous <Continuous>  tecovirimat Capsule 600 milliGRAM(s) Oral two times a day after meals    MEDICATIONS  (PRN):  acetaminophen     Tablet .. 650 milliGRAM(s) Oral every 6 hours PRN Temp greater or equal to 38C (100.4F), Mild Pain (1 - 3)  aluminum hydroxide/magnesium hydroxide/simethicone Suspension 30 milliLiter(s) Oral every 4 hours PRN Dyspepsia  clonazePAM  Tablet 0.25 milliGRAM(s) Oral two times a day PRN severe anxiety  diphenhydrAMINE 25 milliGRAM(s) Oral every 4 hours PRN Rash and/or Itching  hydrocortisone 1% Cream 1 Application(s) Topical two times a day PRN Rash and/or Itching  ibuprofen  Tablet. 600 milliGRAM(s) Oral three times a day PRN Mild Pain (1 - 3)  melatonin 3 milliGRAM(s) Oral at bedtime PRN Insomnia  morphine  - Injectable 2 milliGRAM(s) IV Push every 4 hours PRN Severe Pain (7 - 10)  ondansetron Injectable 4 milliGRAM(s) IV Push every 8 hours PRN Nausea and/or Vomiting  oxyCODONE    IR 2.5 milliGRAM(s) Oral every 4 hours PRN Moderate Pain (4 - 6)      I&O's Summary    04 Aug 2022 07:01  -  05 Aug 2022 07:00  --------------------------------------------------------  IN: 1580 mL / OUT: 0 mL / NET: 1580 mL        PHYSICAL EXAM:  Vital Signs Last 24 Hrs  T(C): 36.7 (05 Aug 2022 08:48), Max: 36.9 (05 Aug 2022 00:25)  T(F): 98.1 (05 Aug 2022 08:48), Max: 98.4 (05 Aug 2022 00:25)  HR: 52 (05 Aug 2022 08:48) (52 - 67)  BP: 127/82 (05 Aug 2022 08:48) (127/82 - 145/83)  BP(mean): --  RR: 18 (05 Aug 2022 08:48) (18 - 18)  SpO2: 98% (05 Aug 2022 08:48) (97% - 99%)    Parameters below as of 05 Aug 2022 08:48  Patient On (Oxygen Delivery Method): room air      CONSTITUTIONAL: NAD, well-groomed  EYES: PERRLA; conjunctiva and sclera clear  ENMT: Moist oral mucosa, no pharyngeal injection or exudates; normal dentition  NECK: Supple, no palpable masses; no thyromegaly  RESPIRATORY: Normal respiratory effort; lungs are clear to auscultation bilaterally  CARDIOVASCULAR: normal S1 and S2, no murmur/rub/gallop; No lower extremity edema  ABDOMEN: Nontender to palpation, normoactive bowel sounds, no rebound/guarding; No hepatosplenomegaly  MUSCULOSKELETAL:  no clubbing or cyanosis of digits; no joint swelling or tenderness to palpation  PSYCH: A+O to person, place, and time; affect appropriate  NEUROLOGY: CN 2-12 are intact and symmetric; no gross sensory deficits   SKIN: Rash similar to yesterday, lesions on chest and back as well as groin.    LABS:      COMMUNICATION:  Care Discussed with Consultants/Other Providers and Details of Discussion: Case discussed with ACP  Discussions with Patient/Family: Case discussed with patient, plan is for discharge following stable PO pain medications    
[facility] Division of Hospital Medicine  Giovanny Rutledge MD  Available via MS Teams    SUBJECTIVE / OVERNIGHT EVENTS: No acute events overnight. Pt seen and examined at bedside. Pt reports agonizing pain, only minimally relieved by oxycodone, also extremely anxious. Genital lesions now oozing liquid causing significant mental anguish. No respiratory sx. Eye discomfort improving w/ erythromycin.     ADDITIONAL REVIEW OF SYSTEMS:    MEDICATIONS  (STANDING):  emtricitabine 200 mG/tenofovir 300 mG (TRUVADA) 1 Tablet(s) Oral daily  erythromycin   Ointment 1 Application(s) Right EYE two times a day  morphine  - Injectable 2 milliGRAM(s) IV Push every 4 hours  mupirocin 2% Ointment 1 Application(s) Topical two times a day  polyethylene glycol 3350 17 Gram(s) Oral daily  senna 2 Tablet(s) Oral at bedtime  tecovirimat Capsule 600 milliGRAM(s) Oral two times a day after meals    MEDICATIONS  (PRN):  acetaminophen     Tablet .. 650 milliGRAM(s) Oral every 6 hours PRN Temp greater or equal to 38C (100.4F), Mild Pain (1 - 3)  aluminum hydroxide/magnesium hydroxide/simethicone Suspension 30 milliLiter(s) Oral every 4 hours PRN Dyspepsia  artificial tears (preservative free) Ophthalmic Solution 1 Drop(s) Right EYE three times a day PRN Dry Eyes  diphenhydrAMINE 25 milliGRAM(s) Oral every 4 hours PRN Rash and/or Itching  ibuprofen  Tablet. 600 milliGRAM(s) Oral three times a day PRN Mild Pain (1 - 3)  melatonin 3 milliGRAM(s) Oral at bedtime PRN Insomnia  morphine  - Injectable 2 milliGRAM(s) IV Push every 4 hours PRN Breakthrough pain  ondansetron Injectable 4 milliGRAM(s) IV Push every 8 hours PRN Nausea and/or Vomiting      I&O's Summary    02 Aug 2022 07:01  -  03 Aug 2022 07:00  --------------------------------------------------------  IN: 1220 mL / OUT: 0 mL / NET: 1220 mL        PHYSICAL EXAM:  Vital Signs Last 24 Hrs  T(C): 36.8 (03 Aug 2022 14:18), Max: 37.2 (02 Aug 2022 19:57)  T(F): 98.2 (03 Aug 2022 14:18), Max: 98.9 (02 Aug 2022 19:57)  HR: 65 (03 Aug 2022 14:18) (55 - 65)  BP: 141/92 (03 Aug 2022 14:18) (132/86 - 143/85)  RR: 18 (03 Aug 2022 14:18) (16 - 18)  SpO2: 98% (03 Aug 2022 14:18) (98% - 100%)    Parameters below as of 03 Aug 2022 14:18  Patient On (Oxygen Delivery Method): room air    CONSTITUTIONAL: NAD, well-developed, well-groomed  EYES: PERRL; conjunctiva and sclera clear, R eye injection, small raised lesion near R eye  NECK: Supple, no palpable masses; no thyromegaly  RESPIRATORY: Normal respiratory effort; nonlabored breathing  CARDIOVASCULAR: normocardic, regular rate  ABDOMEN: Nontender to palpation, normoactive bowel sounds  MUSCULOSKELETAL:  No clubbing or cyanosis of digits; no joint swelling or tenderness to palpation, scabbed lesion on hands  PSYCH: A+O to person, place, and time; affect appropriate  NEUROLOGY: CN 2-12 are intact and symmetric; no gross sensory deficits   SKIN: multiple flat pustules over trunk and back, R eye, scabbed lesions in superpubic region and shaft of penis, now oozing serosanguineous fluid         LABS:                        15.3   7.74  )-----------( 177      ( 02 Aug 2022 07:12 )             47.0     08-02    138  |  103  |  9   ----------------------------<  98  4.7   |  27  |  0.96    Ca    9.0      02 Aug 2022 07:16  Phos  4.2     08-02  Mg     1.9     08-02                    RADIOLOGY & ADDITIONAL TESTS:  New Results Reviewed Today:   New Imaging Personally Reviewed Today:  New Electrocardiogram Personally Reviewed Today:  Prior or Outpatient Records Reviewed Today:    COMMUNICATION:  Care Discussed with Consultants/Other Providers and Details of Discussion: Discussed with ACP  Discussions with Patient/Family:  PCP Communication:
  Follow Up:  monkey pox    Interval History/ROS: "miserable"  severe pain, difficulty sleeping  - notes severe pain -particularly superpubic region and penis - developed few new lesions on face, left leg --- notes marked pain from lesion left index finger    Allergies  Clindamycin Phosphate (Unknown)  Neosporin (Rash)  penicillin G potassium (Rash)    ANTIMICROBIALS:  emtricitabine 200 mG/tenofovir 300 mG (TRUVADA) 1 daily  tecovirimat Capsule 600 two times a day after meals      OTHER MEDS:  MEDICATIONS  (STANDING):  acetaminophen     Tablet .. 650 every 6 hours PRN  aluminum hydroxide/magnesium hydroxide/simethicone Suspension 30 every 4 hours PRN  diphenhydrAMINE 25 every 4 hours PRN  ibuprofen  Tablet. 600 three times a day PRN  melatonin 3 at bedtime PRN  morphine  - Injectable 2 every 4 hours PRN  ondansetron Injectable 4 every 8 hours PRN  oxyCODONE    IR 2.5 every 4 hours PRN  oxyCODONE    IR 10 every 4 hours PRN  polyethylene glycol 3350 17 daily  senna 2 at bedtime      Vital Signs Last 24 Hrs  T(C): 36.8 (03 Aug 2022 05:04), Max: 37.2 (02 Aug 2022 19:57)  T(F): 98.2 (03 Aug 2022 05:04), Max: 98.9 (02 Aug 2022 19:57)  HR: 55 (03 Aug 2022 05:04) (55 - 64)  BP: 132/86 (03 Aug 2022 05:04) (132/86 - 143/85)  BP(mean): --  RR: 18 (03 Aug 2022 05:04) (16 - 18)  SpO2: 98% (03 Aug 2022 05:04) (98% - 100%)    Parameters below as of 03 Aug 2022 05:04  Patient On (Oxygen Delivery Method): room air        PHYSICAL EXAM:  General: WN/WD ill appearing Non-toxic  EYE- conjuncitival reaction,  unable to visualize lower lid ulcer  Neurology: A&Ox3, nonfocal  Respiratory: Clear to auscultation bilaterally  CV: RRR, S1S2, no murmurs, rubs or gallops  Abdominal: Soft, Non-tender, non-distended, normal bowel sounds  Extremities: No edema  Line Sites: Clear  Skin: No rash  - small papules left neck, right neck. forehead, left thigh  swollen lesion with eschar over DIP joint  the carpet of nearly continuous lesions have superficial eschars, dull reactive erythema  lesions on penile shaft                        15.3   7.74  )-----------( 177      ( 02 Aug 2022 07:12 )             47.0     08-02    138  |  103  |  9   ----------------------------<  98  4.7   |  27  |  0.96    Ca    9.0      02 Aug 2022 07:16  Phos  4.2     08-02  Mg     1.9     08-02        Matthew Goss MD; Division of Infectious Disease; Pager: [phone]; nights and weekends: [phone]
  Follow Up:  monkey pox    Interval History/ROS: some pain, complains about being in hospital, requests wound care evaluation, want monkeypox vaccination    Allergies  Clindamycin Phosphate (Unknown)  Neosporin (Rash)  penicillin G potassium (Rash)    ANTIMICROBIALS:  emtricitabine 200 mG/tenofovir 300 mG (TRUVADA) 1 daily  tecovirimat Capsule 600 two times a day after meals      OTHER MEDS:  MEDICATIONS  (STANDING):  acetaminophen     Tablet .. 650 every 6 hours PRN  aluminum hydroxide/magnesium hydroxide/simethicone Suspension 30 every 4 hours PRN  bisacodyl 5 at bedtime  clonazePAM  Tablet 0.25 two times a day PRN  diphenhydrAMINE 25 every 4 hours PRN  ibuprofen  Tablet. 600 three times a day PRN  melatonin 3 at bedtime PRN  morphine  - Injectable 2 every 4 hours PRN  ondansetron Injectable 4 every 8 hours PRN  oxyCODONE    IR 2.5 every 4 hours PRN  polyethylene glycol 3350 17 two times a day  senna 2 at bedtime      Vital Signs Last 24 Hrs  T(C): 36.7 (06 Aug 2022 10:16), Max: 36.8 (05 Aug 2022 20:47)  T(F): 98.1 (06 Aug 2022 10:16), Max: 98.2 (05 Aug 2022 20:47)  HR: 59 (06 Aug 2022 10:16) (57 - 59)  BP: 134/76 (06 Aug 2022 10:16) (117/72 - 136/80)  BP(mean): --  RR: 18 (06 Aug 2022 10:16) (17 - 18)  SpO2: 99% (06 Aug 2022 10:16) (99% - 99%)    Parameters below as of 06 Aug 2022 10:16  Patient On (Oxygen Delivery Method): room air        PHYSICAL EXAM:  General: WN/WD NAD, Non-toxic  Eyes - no conjunctivitis  Neurology: A&Ox3, nonfocal  Respiratory: Clear to auscultation bilaterally  CV: RRR, S1S2, no murmurs, rubs or gallops  Abdominal: Soft, Non-tender, non-distended, normal bowel sounds  Extremities: No edema,  Line Sites: Clear  Skin: No new lesions, facial lesions smaller, area of eschar with swelling on dorum of right index finger decreased  superpubic area continues to evolved with erosion, dull fibrinous eschar            MICROBIOLOGY:    RADIOLOGY:    Matthew Goss MD; Division of Infectious Disease; Pager: [phone]; nights and weekends: [phone]
  Follow Up:  Monkeypox    Interval History/ROS:  Beeter!  no new lesions,  pain in shower with removal of dressing over superpubic region and penis    Allergies  Clindamycin Phosphate (Unknown)  Neosporin (Rash)  penicillin G potassium (Rash)    ANTIMICROBIALS:  emtricitabine 200 mG/tenofovir 300 mG (TRUVADA) 1 daily  tecovirimat Capsule 600 two times a day after meals      OTHER MEDS:  MEDICATIONS  (STANDING):  acetaminophen     Tablet .. 650 every 6 hours PRN  aluminum hydroxide/magnesium hydroxide/simethicone Suspension 30 every 4 hours PRN  bisacodyl 5 at bedtime  clonazePAM  Tablet 0.25 two times a day PRN  diphenhydrAMINE 25 every 4 hours PRN  ibuprofen  Tablet. 600 three times a day PRN  melatonin 3 at bedtime PRN  morphine  - Injectable 2 every 4 hours PRN  ondansetron Injectable 4 every 8 hours PRN  oxyCODONE    IR 2.5 every 4 hours PRN  polyethylene glycol 3350 17 two times a day  senna 2 at bedtime      Vital Signs Last 24 Hrs  T(C): 36.7 (05 Aug 2022 08:48), Max: 36.9 (05 Aug 2022 00:25)  T(F): 98.1 (05 Aug 2022 08:48), Max: 98.4 (05 Aug 2022 00:25)  HR: 52 (05 Aug 2022 08:48) (52 - 67)  BP: 127/82 (05 Aug 2022 08:48) (127/82 - 145/83)  BP(mean): --  RR: 18 (05 Aug 2022 08:48) (18 - 18)  SpO2: 98% (05 Aug 2022 08:48) (97% - 99%)    Parameters below as of 05 Aug 2022 08:48  Patient On (Oxygen Delivery Method): room air        PHYSICAL EXAM:  General: WN/WD NAD, Non-toxic  Neurology: A&Ox3, nonfocal  Eye - right lower lid ulcer resolved  Respiratory: Clear to auscultation bilaterally  CV: RRR, S1S2, no murmurs, rubs or gallops  Abdominal: Soft, Non-tender, non-distended, normal bowel sounds  Extremities: No edema,  Line Sites: Clear  Skin: few scattered lesions are smaller - the area of contiguous lesions in superpubic region and penis now demonstrate superfical bloody erosions with decreased reactive erythema        Matthew Goss MD; Division of Infectious Disease; Pager: [phone]; nights and weekends: [phone]
  Follow Up:  monkey pox, local pain    Interval History/ROS: very upset about pain  - dressing over superpubic and penis area with nearly confuent monkeypox lesions came off,pulling off eschars with bleeding and severe pain - he previously was having improved pain control and was able to sleep  taking po  - consuming meals with fat prior to Tecovirimat  passed formed bowel movement this am    no new lesions    Allergies  Clindamycin Phosphate (Unknown)  Neosporin (Rash)  penicillin G potassium (Rash)    ANTIMICROBIALS:  emtricitabine 200 mG/tenofovir 300 mG (TRUVADA) 1 daily  tecovirimat Capsule 600 two times a day after meals      OTHER MEDS:  MEDICATIONS  (STANDING):  acetaminophen     Tablet .. 650 every 6 hours PRN  aluminum hydroxide/magnesium hydroxide/simethicone Suspension 30 every 4 hours PRN  bisacodyl 5 at bedtime  clonazePAM  Tablet 0.25 two times a day PRN  diphenhydrAMINE 25 every 4 hours PRN  ibuprofen  Tablet. 600 three times a day PRN  melatonin 3 at bedtime PRN  morphine  - Injectable 2 every 4 hours  morphine  - Injectable 2 every 4 hours PRN  ondansetron Injectable 4 every 8 hours PRN  polyethylene glycol 3350 17 two times a day  senna 2 at bedtime      Vital Signs Last 24 Hrs  T(C): 36.7 (04 Aug 2022 13:56), Max: 37 (04 Aug 2022 06:43)  T(F): 98.1 (04 Aug 2022 13:56), Max: 98.6 (04 Aug 2022 06:43)  HR: 60 (04 Aug 2022 13:56) (57 - 67)  BP: 132/65 (04 Aug 2022 13:56) (123/75 - 141/92)  BP(mean): --  RR: 18 (04 Aug 2022 13:56) (16 - 18)  SpO2: 96% (04 Aug 2022 13:56) (95% - 98%)    Parameters below as of 04 Aug 2022 13:56  Patient On (Oxygen Delivery Method): room air        PHYSICAL EXAM:  General: WN/WD NAD, Non-toxic  Neurology: A&Ox3, nonfocal  Respiratory: Clear to auscultation bilaterally  CV: RRR, S1S2, no murmurs, rubs or gallops  Abdominal: Soft, Non-tender, non-distended,  Extremities: No edema,   erythematous swelling right forearm near iv site  Line Sites: Clear  Skin: no new lesions  - fresh blood at site of eschared pox lesions in superpubic region    MICROBIOLOGY:   (08.01.22 @ 04:16) Orthopoxvirus DNA: Detected: Non-variola Orthopoxvirus DNA detected by real-time PCR.      RADIOLOGY:    Matthew Goss MD; Division of Infectious Disease; Pager: [phone]; nights and weekends: [phone]
[facility] Division of Hospital Medicine  Giovanny Rutledge MD  Available via MS Teams    SUBJECTIVE / OVERNIGHT EVENTS: No acute events overnight. Pt seen and examined at bedside. Significant pain, burning in suprapubic, penile region. Erythromycin ointment improves ocular discomfort. Morphine partially relieved pain overnight but pt experienced nausea in AM. Denies CP, SOB.     ADDITIONAL REVIEW OF SYSTEMS:    MEDICATIONS  (STANDING):  emtricitabine 200 mG/tenofovir 300 mG (TRUVADA) 1 Tablet(s) Oral daily  erythromycin   Ointment 1 Application(s) Right EYE two times a day  mupirocin 2% Ointment 1 Application(s) Topical two times a day  polyethylene glycol 3350 17 Gram(s) Oral daily  senna 2 Tablet(s) Oral at bedtime  tecovirimat Capsule 600 milliGRAM(s) Oral two times a day after meals    MEDICATIONS  (PRN):  acetaminophen     Tablet .. 650 milliGRAM(s) Oral every 6 hours PRN Temp greater or equal to 38C (100.4F), Mild Pain (1 - 3)  aluminum hydroxide/magnesium hydroxide/simethicone Suspension 30 milliLiter(s) Oral every 4 hours PRN Dyspepsia  artificial tears (preservative free) Ophthalmic Solution 1 Drop(s) Right EYE three times a day PRN Dry Eyes  diphenhydrAMINE 25 milliGRAM(s) Oral every 4 hours PRN Rash and/or Itching  ibuprofen  Tablet. 600 milliGRAM(s) Oral three times a day PRN Mild Pain (1 - 3)  melatonin 3 milliGRAM(s) Oral at bedtime PRN Insomnia  ondansetron Injectable 4 milliGRAM(s) IV Push every 8 hours PRN Nausea and/or Vomiting  oxyCODONE    IR 5 milliGRAM(s) Oral every 4 hours PRN Severe Pain (7 - 10)  oxyCODONE    IR 2.5 milliGRAM(s) Oral every 4 hours PRN Moderate Pain (4 - 6)      I&O's Summary    01 Aug 2022 07:01  -  02 Aug 2022 07:00  --------------------------------------------------------  IN: 1060 mL / OUT: 650 mL / NET: 410 mL    PHYSICAL EXAM:  Vital Signs Last 24 Hrs  T(C): 36.6 (02 Aug 2022 11:56), Max: 37.1 (02 Aug 2022 06:48)  T(F): 97.9 (02 Aug 2022 11:56), Max: 98.8 (02 Aug 2022 06:48)  HR: 58 (02 Aug 2022 11:56) (53 - 62)  BP: 126/75 (02 Aug 2022 11:56) (126/75 - 148/84)  RR: 18 (02 Aug 2022 11:56) (18 - 18)  SpO2: 99% (02 Aug 2022 11:56) (98% - 99%)    Parameters below as of 02 Aug 2022 11:56  Patient On (Oxygen Delivery Method): room air    CONSTITUTIONAL: NAD, well-developed, well-groomed  EYES: PERRL; conjunctiva and sclera clear, R eye injection, small raised lesion near R eye  NECK: Supple, no palpable masses; no thyromegaly  RESPIRATORY: Normal respiratory effort; nonlabored breathing  CARDIOVASCULAR: normocardic, regular rate  ABDOMEN: Nontender to palpation, normoactive bowel sounds  MUSCULOSKELETAL:  No clubbing or cyanosis of digits; no joint swelling or tenderness to palpation, scabbed lesion on hands  PSYCH: A+O to person, place, and time; affect appropriate  NEUROLOGY: CN 2-12 are intact and symmetric; no gross sensory deficits   SKIN: multiple flat pustules over trunk and back, R eye, scabbed lesions in superpubic region and shaft of penis    LABS:                        15.3   7.74  )-----------( 177      ( 02 Aug 2022 07:12 )             47.0     08-02    138  |  103  |  9   ----------------------------<  98  4.7   |  27  |  0.96    Ca    9.0      02 Aug 2022 07:16  Phos  4.2     08-02  Mg     1.9     08-02    TPro  7.6  /  Alb  4.2  /  TBili  0.4  /  DBili  x   /  AST  24  /  ALT  31  /  AlkPhos  94  07-3      RADIOLOGY & ADDITIONAL TESTS:  New Results Reviewed Today: Monkeypox positive as outpatient  New Imaging Personally Reviewed Today:  New Electrocardiogram Personally Reviewed Today:  Prior or Outpatient Records Reviewed Today:    COMMUNICATION:  Care Discussed with Consultants/Other Providers and Details of Discussion: Discussed with ACP  Discussions with Patient/Family: mom at bedside  PCP Communication:
[facility] DEPARTMENT OF OPHTHALMOLOGY  ------------------------------------------------------------------------------    Interval History: Following for OD conj/lid pox lesions. No acute events overnight. Today patient states eye discomfort has totally resolved. No change in vision.      MEDICATIONS  (STANDING):  artificial tears (preservative free) Ophthalmic Solution 1 Drop(s) Right EYE every 4 hours  bisacodyl 5 milliGRAM(s) Oral at bedtime  chlorhexidine 2% Cloths 1 Application(s) Topical daily  emtricitabine 200 mG/tenofovir 300 mG (TRUVADA) 1 Tablet(s) Oral daily  erythromycin   Ointment 1 Application(s) Right EYE four times a day  mupirocin 2% Ointment 1 Application(s) Topical two times a day  polyethylene glycol 3350 17 Gram(s) Oral two times a day  senna 2 Tablet(s) Oral at bedtime  sodium chloride 0.9%. 1000 milliLiter(s) (75 mL/Hr) IV Continuous <Continuous>  tecovirimat Capsule 600 milliGRAM(s) Oral two times a day after meals    MEDICATIONS  (PRN):  acetaminophen     Tablet .. 650 milliGRAM(s) Oral every 6 hours PRN Temp greater or equal to 38C (100.4F), Mild Pain (1 - 3)  aluminum hydroxide/magnesium hydroxide/simethicone Suspension 30 milliLiter(s) Oral every 4 hours PRN Dyspepsia  clonazePAM  Tablet 0.25 milliGRAM(s) Oral two times a day PRN severe anxiety  diphenhydrAMINE 25 milliGRAM(s) Oral every 4 hours PRN Rash and/or Itching  hydrocortisone 1% Cream 1 Application(s) Topical two times a day PRN Rash and/or Itching  ibuprofen  Tablet. 600 milliGRAM(s) Oral three times a day PRN Mild Pain (1 - 3)  melatonin 3 milliGRAM(s) Oral at bedtime PRN Insomnia  morphine  - Injectable 2 milliGRAM(s) IV Push every 4 hours PRN Severe Pain (7 - 10)  ondansetron Injectable 4 milliGRAM(s) IV Push every 8 hours PRN Nausea and/or Vomiting  oxyCODONE    IR 2.5 milliGRAM(s) Oral every 4 hours PRN Moderate Pain (4 - 6)      VITALS: T(C): 36.7 (08-05-22 @ 08:48)  T(F): 98.1 (08-05-22 @ 08:48), Max: 98.4 (08-05-22 @ 00:25)  HR: 52 (08-05-22 @ 08:48) (52 - 67)  BP: 127/82 (08-05-22 @ 08:48) (127/82 - 145/83)  RR:  (18 - 18)  SpO2:  (97% - 99%)  Wt(kg): --  General: AAO x 3, appropriate mood and affect    Ophthalmology Exam:  Visual acuity (sc): 20/20 OU  Pupils: PERRL OU, no APD  Extraocular movements (EOMs): Full OU, no pain, no diplopia    Pen Light Exam (PLE)  External: Flat OU  Lids/Lashes/Lacrimal Ducts: Single red 3mm on RUL at lid margin, RLL conj lesion fully resolved, caruncle lesion fully resolved; Flat OS  Sclera/Conjunctiva: W+Q OU  Cornea: Cl OU  Anterior Chamber: D+F OU    Iris: Flat OU  Lens: Cl OU  
[facility] Division of Hospital Medicine  Rob Phipps MD  Available via MS Teams  Pager [phone]    SUBJECTIVE / OVERNIGHT EVENTS: No acute events reported overnight    ADDITIONAL REVIEW OF SYSTEMS: Patient reports that he feels better today than yesterday. Reports he is still having severe pain however it is controlled on his current analgesic regimen. Denies subjective fever. Denies chest pain or shortness of breath. Denies abdominal pain, nausea or vomiting. ROS otherwise noncontributory.     MEDICATIONS  (STANDING):  artificial tears (preservative free) Ophthalmic Solution 1 Drop(s) Right EYE every 4 hours  bisacodyl 5 milliGRAM(s) Oral at bedtime  emtricitabine 200 mG/tenofovir 300 mG (TRUVADA) 1 Tablet(s) Oral daily  erythromycin   Ointment 1 Application(s) Right EYE four times a day  morphine  - Injectable 2 milliGRAM(s) IV Push every 4 hours  mupirocin 2% Ointment 1 Application(s) Topical two times a day  polyethylene glycol 3350 17 Gram(s) Oral two times a day  senna 2 Tablet(s) Oral at bedtime  tecovirimat Capsule 600 milliGRAM(s) Oral two times a day after meals    MEDICATIONS  (PRN):  acetaminophen     Tablet .. 650 milliGRAM(s) Oral every 6 hours PRN Temp greater or equal to 38C (100.4F), Mild Pain (1 - 3)  aluminum hydroxide/magnesium hydroxide/simethicone Suspension 30 milliLiter(s) Oral every 4 hours PRN Dyspepsia  clonazePAM  Tablet 0.25 milliGRAM(s) Oral two times a day PRN severe anxiety  diphenhydrAMINE 25 milliGRAM(s) Oral every 4 hours PRN Rash and/or Itching  ibuprofen  Tablet. 600 milliGRAM(s) Oral three times a day PRN Mild Pain (1 - 3)  melatonin 3 milliGRAM(s) Oral at bedtime PRN Insomnia  morphine  - Injectable 2 milliGRAM(s) IV Push every 4 hours PRN Breakthrough pain  ondansetron Injectable 4 milliGRAM(s) IV Push every 8 hours PRN Nausea and/or Vomiting      I&O's Summary    03 Aug 2022 07:01  -  04 Aug 2022 07:00  --------------------------------------------------------  IN: 1200 mL / OUT: 0 mL / NET: 1200 mL        PHYSICAL EXAM:  Vital Signs Last 24 Hrs  T(C): 36.7 (04 Aug 2022 13:56), Max: 37 (04 Aug 2022 06:43)  T(F): 98.1 (04 Aug 2022 13:56), Max: 98.6 (04 Aug 2022 06:43)  HR: 60 (04 Aug 2022 13:56) (57 - 67)  BP: 132/65 (04 Aug 2022 13:56) (123/75 - 141/92)  BP(mean): --  RR: 18 (04 Aug 2022 13:56) (16 - 18)  SpO2: 96% (04 Aug 2022 13:56) (95% - 98%)    Parameters below as of 04 Aug 2022 13:56  Patient On (Oxygen Delivery Method): room air    CONSTITUTIONAL: NAD, well-groomed  EYES: PERRLA; conjunctiva and sclera clear  ENMT: Moist oral mucosa, no pharyngeal injection or exudates; normal dentition  NECK: Supple, no palpable masses; no thyromegaly  RESPIRATORY: Normal respiratory effort; lungs are clear to auscultation bilaterally  CARDIOVASCULAR: normal S1 and S2, no murmur/rub/gallop; No lower extremity edema  ABDOMEN: Nontender to palpation, normoactive bowel sounds, no rebound/guarding; No hepatosplenomegaly  MUSCULOSKELETAL:  no clubbing or cyanosis of digits; no joint swelling or tenderness to palpation  PSYCH: A+O to person, place, and time; affect appropriate  NEUROLOGY: CN 2-12 are intact and symmetric; no gross sensory deficits   SKIN: Multiple scattered pustules over the back and trunk, crusting lesions around the right eye. Scabbed lesions in the suprapubic region.     COMMUNICATION:  Care Discussed with Consultants/Other Providers and Details of Discussion: Case discussed with ACP  Discussions with Patient/Family: Case discussed with patient. Plan is for continued antiviral treatment and eventual transition to PO analgesics prior to discharge home.  
______________  Alex Sotelo MD  [facility]  [phone]    SUBJECTIVE / OVERNIGHT EVENTS: No acute events reported overnight    ADDITIONAL REVIEW OF SYSTEMS: patient anxious for discharge today, pain managed with current opioids, ROS otherwise negative    MEDICATIONS  (STANDING):  artificial tears (preservative free) Ophthalmic Solution 1 Drop(s) Right EYE every 4 hours  bisacodyl 5 milliGRAM(s) Oral at bedtime  chlorhexidine 2% Cloths 1 Application(s) Topical daily  emtricitabine 200 mG/tenofovir 300 mG (TRUVADA) 1 Tablet(s) Oral daily  erythromycin   Ointment 1 Application(s) Right EYE four times a day  mupirocin 2% Ointment 1 Application(s) Topical two times a day  polyethylene glycol 3350 17 Gram(s) Oral two times a day  senna 2 Tablet(s) Oral at bedtime  sodium chloride 0.9%. 1000 milliLiter(s) (75 mL/Hr) IV Continuous <Continuous>  tecovirimat Capsule 600 milliGRAM(s) Oral two times a day after meals    MEDICATIONS  (PRN):  acetaminophen     Tablet .. 650 milliGRAM(s) Oral every 6 hours PRN Temp greater or equal to 38C (100.4F), Mild Pain (1 - 3)  aluminum hydroxide/magnesium hydroxide/simethicone Suspension 30 milliLiter(s) Oral every 4 hours PRN Dyspepsia  clonazePAM  Tablet 0.25 milliGRAM(s) Oral two times a day PRN severe anxiety  diphenhydrAMINE 25 milliGRAM(s) Oral every 4 hours PRN Rash and/or Itching  hydrocortisone 1% Cream 1 Application(s) Topical two times a day PRN Rash and/or Itching  ibuprofen  Tablet. 600 milliGRAM(s) Oral three times a day PRN Mild Pain (1 - 3)  melatonin 3 milliGRAM(s) Oral at bedtime PRN Insomnia  morphine  - Injectable 2 milliGRAM(s) IV Push every 4 hours PRN Severe Pain (7 - 10)  ondansetron Injectable 4 milliGRAM(s) IV Push every 8 hours PRN Nausea and/or Vomiting  oxyCODONE    IR 2.5 milliGRAM(s) Oral every 4 hours PRN Moderate Pain (4 - 6)    PHYSICAL EXAM:  Vital Signs Last 24 Hrs  T(C): 36.7 (06 Aug 2022 10:16), Max: 36.8 (05 Aug 2022 20:47)  T(F): 98.1 (06 Aug 2022 10:16), Max: 98.2 (05 Aug 2022 20:47)  HR: 59 (06 Aug 2022 10:16) (57 - 59)  BP: 134/76 (06 Aug 2022 10:16) (117/72 - 136/80)  BP(mean): --  RR: 18 (06 Aug 2022 10:16) (17 - 18)  SpO2: 99% (06 Aug 2022 10:16) (99% - 99%)    Parameters below as of 06 Aug 2022 10:16  Patient On (Oxygen Delivery Method): room air    CONSTITUTIONAL: NAD, well-developed, well-groomed  EYES: EOMI, conjunctiva and sclera clear  ENMT: normal  RESPIRATORY: no labored breathing  ABDOMEN: nondistended  PSYCH: affect appropriate  NEUROLOGY: grossly normal  SKIN: no jaundice, see RN documentation      LABS:      COMMUNICATION:  Care Discussed with Consultants/Other Providers and Details of Discussion: Case discussed with ID, ACP  Discussions with Patient/Family: Case discussed with patient, plan is for discharge today    
[facility] Division of Hospital Medicine  Giovanny Rutledge MD  Available via MS Teams    SUBJECTIVE / OVERNIGHT EVENTS: No acute events overnight. Pt seen and examined at bedside. C/o fevers, painful, itchy suprapubic/penile rash, and now diffusely all over body. No chest pain or SOB.  No vision changes but itchiness around R eye.      MEDICATIONS  (STANDING):  emtricitabine 200 mG/tenofovir 300 mG (TRUVADA) 1 Tablet(s) Oral daily  mupirocin 2% Ointment 1 Application(s) Topical two times a day  tecovirimat Capsule 600 milliGRAM(s) Oral two times a day after meals    MEDICATIONS  (PRN):  acetaminophen     Tablet .. 650 milliGRAM(s) Oral every 6 hours PRN Temp greater or equal to 38C (100.4F), Mild Pain (1 - 3)  aluminum hydroxide/magnesium hydroxide/simethicone Suspension 30 milliLiter(s) Oral every 4 hours PRN Dyspepsia  artificial tears (preservative free) Ophthalmic Solution 1 Drop(s) Right EYE three times a day PRN Dry Eyes  melatonin 3 milliGRAM(s) Oral at bedtime PRN Insomnia  ondansetron Injectable 4 milliGRAM(s) IV Push every 8 hours PRN Nausea and/or Vomiting      I&O's Summary    31 Jul 2022 07:01  -  01 Aug 2022 07:00  --------------------------------------------------------  IN: 240 mL / OUT: 0 mL / NET: 240 mL    01 Aug 2022 07:01  -  01 Aug 2022 16:28  --------------------------------------------------------  IN: 560 mL / OUT: 300 mL / NET: 260 mL        PHYSICAL EXAM:  Vital Signs Last 24 Hrs  T(C): 36.8 (01 Aug 2022 10:28), Max: 37.6 (31 Jul 2022 23:12)  T(F): 98.3 (01 Aug 2022 10:28), Max: 99.6 (31 Jul 2022 23:12)  HR: 65 (01 Aug 2022 10:28) (65 - 98)  BP: 129/75 (01 Aug 2022 10:28) (128/73 - 142/74)  RR: 18 (01 Aug 2022 10:28) (18 - 18)  SpO2: 97% (01 Aug 2022 10:28) (96% - 99%)    Parameters below as of 01 Aug 2022 10:28  Patient On (Oxygen Delivery Method): room air      CONSTITUTIONAL: NAD, well-developed, well-groomed  EYES: PERRL; conjunctiva and sclera clear, R eye injection  NECK: Supple, no palpable masses; no thyromegaly  RESPIRATORY: Normal respiratory effort; nonlabored breathing  CARDIOVASCULAR: normocardic, regular rate  ABDOMEN: Nontender to palpation, normoactive bowel sounds  MUSCULOSKELETAL:  No clubbing or cyanosis of digits; no joint swelling or tenderness to palpation  PSYCH: A+O to person, place, and time; affect appropriate  NEUROLOGY: CN 2-12 are intact and symmetric; no gross sensory deficits   SKIN: multiple flat pustules over trunk and back, R eye, scabbed lesions in superpubic region and shaft of penis    LABS:                        15.8   5.87  )-----------( 165      ( 01 Aug 2022 07:21 )             47.9     08-01    137  |  101  |  9   ----------------------------<  96  3.8   |  25  |  0.98    Ca    9.3      01 Aug 2022 07:17  Phos  3.8     08-01  Mg     2.1     08-01    TPro  7.6  /  Alb  4.2  /  TBili  0.4  /  DBili  x   /  AST  24  /  ALT  31  /  AlkPhos  94  07-31    RADIOLOGY & ADDITIONAL TESTS:  New Results Reviewed Today:   New Imaging Personally Reviewed Today:  New Electrocardiogram Personally Reviewed Today:  Prior or Outpatient Records Reviewed Today:    COMMUNICATION:  Care Discussed with Consultants/Other Providers and Details of Discussion: Discussed with ACP, ID  Discussions with Patient/Family:  PCP Communication:

## 2022-08-06 NOTE — DISCHARGE NOTE PROVIDER - NSDCMRMEDTOKEN_GEN_ALL_CORE_FT
emtricitabine-tenofovir disoproxil 200 mg-300 mg oral tablet: 1 tab(s) orally once a day  tecovirimat Capsule: 600 milligram(s) orally 2 times a day (after meals)   bisacodyl 5 mg oral delayed release tablet: 1 tab(s) orally once a day (at bedtime)  emtricitabine-tenofovir disoproxil 200 mg-300 mg oral tablet: 1 tab(s) orally once a day  erythromycin 0.5% ophthalmic ointment: 1 application to each affected eye 4 times a day  hydrocortisone 1% topical cream: 1 application topically 2 times a day, As needed, Rash and/or Itching  mupirocin 2% topical ointment: 1 application topically 2 times a day  ocular lubricant ophthalmic solution: 1 drop(s) to each affected eye every 4 hours  oxyCODONE 5 mg oral capsule: 0.5 cap(s) orally every 4 hours MDD:3  senna leaf extract oral tablet: 2 tab(s) orally once a day (at bedtime)  tecovirimat Capsule: 600 milligram(s) orally 2 times a day (after meals)

## 2022-08-06 NOTE — DISCHARGE NOTE PROVIDER - HOSPITAL COURSE
31M MSM on on Truvada for PREP, p/w fevers and chills, positive for COVID infection and now confirmed monkeypox infection, remains inpatient due to pain medication requirements     ·  Problem: Monkeypox.   ·  Plan: Monkeypox PCR is positive. Outpatient HSV 1/2 swab negative on 7/26. Recent RPR and GC/CH negative. HIV negative. Repeat swab while inpatient positive.   - ID consulted, will enroll in TPOXX trial. Ongoing recommendations appreciated, plan is for full 14 day course with last dose to be given on 8/15, to d/c with remaining course  - Ophthalmology consulted for ocular involvement - erythromycin ointment BID and tears  - continue home PREP, Truvada  - contact and airborne precaution    Pain control:  - used oxy PRN x 4 and morphine PRN x 3/24 hours  - d/c on oxycodone IR 2.5 mg q4hrs PRN for breakthrough pain  - patient was with bowel movement 8/5, no OIC on current medication regimen.    ·  Problem: 2019 novel coronavirus disease (COVID-19).   ·  Plan: tested positive about one week ago with mild symptoms. Initial symptoms of cough and nasal congestion are improved  - continue symptomatic treatment  - no hypoxia or indications for systemic steroids or other therapies at this time  - per patient request, will check COVID test prior to d/c, he is aware if could be persistently positive.      Pt stable dc home with outpatient follow up with Infectious Disease.

## 2022-08-06 NOTE — PROGRESS NOTE ADULT - ASSESSMENT
31M on PREP admitted 7/31/22 with progressive lesions consistent with Monkey pox. He has developed lesions in right eyelids and progressive painful lesions at superpubic region, base of penis and shaft with increased pain. He has fever  There has been near complete resolution of COVID related symptoms - he has slight cough, no sob    Monkeypox PCR from 7/26/22 and 8/1/22  is POSITIVE confirming clinical diagnosis    improvement evident as of 8/4   3rd day of Tecovirmat, though the severe pain with eschar removal was intense 8/4  sustained improvement 8/5  Tolerating Tecovirimat    patient states he wants to go home  current evidence indicates good immunity for at least 1 year after infection - not a monkeypox vaccine candidate at the present time    Suggest  On TPOXX trial - Continue Tecovirimat 8/1-->    x14 day course   Bactroban ointment to superpubic region    NO ID objection to discharge with remainder of Tecovirimat with Oxycodone  Message left for covering hospitalist  I will follow up by phone next week

## 2022-08-06 NOTE — DISCHARGE NOTE NURSING/CASE MANAGEMENT/SOCIAL WORK - NSDCPEFALRISK_GEN_ALL_CORE
For information on Fall & Injury Prevention, visit: [email or web address] OR  [email or web address] OR  https://www.cdc.gov/steadi/patient.html

## 2022-08-07 ENCOUNTER — NON-APPOINTMENT (OUTPATIENT)
Age: 32
End: 2022-08-07

## 2022-08-10 ENCOUNTER — NON-APPOINTMENT (OUTPATIENT)
Age: 32
End: 2022-08-10

## 2022-08-11 PROCEDURE — 83735 ASSAY OF MAGNESIUM: CPT

## 2022-08-11 PROCEDURE — 87593 ORTHOPOXVIRUS AMP PRB EACH: CPT

## 2022-08-11 PROCEDURE — 85025 COMPLETE CBC W/AUTO DIFF WBC: CPT

## 2022-08-11 PROCEDURE — 80053 COMPREHEN METABOLIC PANEL: CPT

## 2022-08-11 PROCEDURE — 80048 BASIC METABOLIC PNL TOTAL CA: CPT

## 2022-08-11 PROCEDURE — 36415 COLL VENOUS BLD VENIPUNCTURE: CPT

## 2022-08-11 PROCEDURE — 85027 COMPLETE CBC AUTOMATED: CPT

## 2022-08-11 PROCEDURE — 87389 HIV-1 AG W/HIV-1&-2 AB AG IA: CPT

## 2022-08-11 PROCEDURE — 84100 ASSAY OF PHOSPHORUS: CPT

## 2022-08-11 PROCEDURE — 96374 THER/PROPH/DIAG INJ IV PUSH: CPT

## 2022-08-11 PROCEDURE — 87637 SARSCOV2&INF A&B&RSV AMP PRB: CPT

## 2022-08-11 PROCEDURE — 99285 EMERGENCY DEPT VISIT HI MDM: CPT

## 2022-08-11 PROCEDURE — 0225U NFCT DS DNA&RNA 21 SARSCOV2: CPT

## 2022-08-12 ENCOUNTER — APPOINTMENT (OUTPATIENT)
Dept: INFECTIOUS DISEASE | Facility: CLINIC | Age: 32
End: 2022-08-12

## 2022-08-12 DIAGNOSIS — B04 MONKEYPOX: ICD-10-CM

## 2022-08-12 PROCEDURE — 99213 OFFICE O/P EST LOW 20 MIN: CPT

## 2022-08-12 RX ORDER — TECOVIRIMAT MONOHYDRATE 200 MG/1
200 CAPSULE ORAL
Refills: 0 | Status: ACTIVE | COMMUNITY

## 2022-08-12 NOTE — PHYSICAL EXAM
[General Appearance - Alert] : alert [General Appearance - In No Acute Distress] : in no acute distress [Sclera] : the sclera and conjunctiva were normal [PERRL With Normal Accommodation] : pupils were equal in size, round, reactive to light [Extraocular Movements] : extraocular movements were intact [Outer Ear] : the ears and nose were normal in appearance [Oropharynx] : the oropharynx was normal with no thrush [FreeTextEntry1] : there are no monkey pox lesions at present, but epitheilized erosions on superpubid region, shaft on f pneis and dorsal asptict of right 3rd finger ad DIP joint

## 2022-08-12 NOTE — ASSESSMENT
[Universal Precautions] : universal precautions [Medical Care Issues] : medical care issues [FreeTextEntry1] : resolved monkeypox\par in areas of larger lesions, there is some residual erosions with epithelialization\par He is no longer infectious to others\par no covid related symptoms\par \par He will complete the prescribed 14 day course of Tecoviramat on evening of 8/14/22\par he is not currently sexually active  - instructed to resume Truvada 24 hours prior to sexual activity for PREP for HIV prevention\par \par topical erythromycin ointment prescribed

## 2022-08-12 NOTE — CONSULT LETTER
[Dear  ___] : Dear  [unfilled], [Consult Letter:] : I had the pleasure of evaluating your patient, [unfilled]. [Please see my note below.] : Please see my note below. [Consult Closing:] : Thank you very much for allowing me to participate in the care of this patient.  If you have any questions, please do not hesitate to contact me. [Sincerely,] : Sincerely, [FreeTextEntry2] : Dr Wily Barkley\par [address]\[place] [address]  [FreeTextEntry3] : Matthwe Goss MD, FACP, MELISA, AAHIVM\par Infectious Diseases\par [facility]

## 2022-08-12 NOTE — HISTORY OF PRESENT ILLNESS
[FreeTextEntry1] : much better!\par stopped taking oxycodone on 8/8 - only mild discomfort\par lesions on right eye, eyelid, face, shoulder and trunk have resolved\par there are no lesions at present, but epitheilized erosions on superpubid region, shaft on f pneis and dorsal asptict of right 3rd finger ad DIP joint\par no fever, chills, malaise, focal pain\par notes fatigue\par not sexually active\par very carefully adherent with TPOXX - denies any related GI distress or adverse effects\par \par as previously noted:\par Mr Pena was h ospitalized [facility] 7/31 --> 8/6/22 with progressive and painful Monkeypox\par \par 31M on PREP admitted 7/31/22 with progressive lesions consistent with Monkey pox. He first noted rash in genital area on 7/25/22. He has developed lesions in right eyelids and progressive painful lesions at superpubic region, base of penis and shaft with increased pain. He has fever\par There has been near complete resolution of COVID related symptoms - he has slight cough, no sob\par \par Monkeypox PCR from 7/26/22 and 8/1/22 is POSITIVE confirming clinical diagnosis\par \par improvement evident as of 8/4 3rd day of Tecovirmat, though the severe pain with eschar removal was intense 8/4\par sustained improvement 8/5\par Tolerating Tecovirimat\par \par patient states he wants to go home\par current evidence indicates good immunity for at least 1 year after infection - not a monkeypox vaccine candidate at the present time\par \par On TPOXX trial - Continue Tecovirimat 600 mg - 3 pills twice daily  8/1-->\par  x14 day course \par Bactroban ointment to superpubic region - prescribed - he is using erhtryomycin ointment\par \par Discharged 8/6/21 with Oxycodone IR 2.5 mg q4hrs PRN for breakthrough pain (5 mg 1/2 tab) - he stopped after 8/8 dose\par . \par

## 2022-08-17 ENCOUNTER — RX RENEWAL (OUTPATIENT)
Age: 32
End: 2022-08-17

## 2022-09-08 ENCOUNTER — NON-APPOINTMENT (OUTPATIENT)
Age: 32
End: 2022-09-08

## 2022-09-19 ENCOUNTER — NON-APPOINTMENT (OUTPATIENT)
Age: 32
End: 2022-09-19

## 2022-09-26 ENCOUNTER — APPOINTMENT (OUTPATIENT)
Dept: INFECTIOUS DISEASE | Facility: CLINIC | Age: 32
End: 2022-09-26

## 2022-10-03 ENCOUNTER — APPOINTMENT (OUTPATIENT)
Dept: INFECTIOUS DISEASE | Facility: CLINIC | Age: 32
End: 2022-10-03

## 2022-11-07 ENCOUNTER — OUTPATIENT (OUTPATIENT)
Dept: OUTPATIENT SERVICES | Facility: HOSPITAL | Age: 32
LOS: 1 days | End: 2022-11-07
Payer: MEDICAID

## 2022-11-07 ENCOUNTER — LABORATORY RESULT (OUTPATIENT)
Age: 32
End: 2022-11-07

## 2022-11-07 ENCOUNTER — OUTPATIENT (OUTPATIENT)
Dept: OUTPATIENT SERVICES | Facility: HOSPITAL | Age: 32
LOS: 1 days | End: 2022-11-07

## 2022-11-07 ENCOUNTER — APPOINTMENT (OUTPATIENT)
Dept: INFECTIOUS DISEASE | Facility: CLINIC | Age: 32
End: 2022-11-07

## 2022-11-07 VITALS
TEMPERATURE: 97.4 F | HEIGHT: 71 IN | HEART RATE: 65 BPM | BODY MASS INDEX: 28.14 KG/M2 | OXYGEN SATURATION: 98 % | SYSTOLIC BLOOD PRESSURE: 133 MMHG | WEIGHT: 201 LBS | RESPIRATION RATE: 16 BRPM | DIASTOLIC BLOOD PRESSURE: 74 MMHG

## 2022-11-07 DIAGNOSIS — B97.89 OTHER VIRAL AGENTS AS THE CAUSE OF DISEASES CLASSIFIED ELSEWHERE: ICD-10-CM

## 2022-11-07 DIAGNOSIS — Z11.3 ENCOUNTER FOR SCREENING FOR INFECTIONS WITH A PREDOMINANTLY SEXUAL MODE OF TRANSMISSION: ICD-10-CM

## 2022-11-07 DIAGNOSIS — Z23 ENCOUNTER FOR IMMUNIZATION: ICD-10-CM

## 2022-11-07 PROCEDURE — 99214 OFFICE O/P EST MOD 30 MIN: CPT

## 2022-11-07 RX ORDER — ERYTHROMYCIN 20 MG/G
2 GEL TOPICAL TWICE DAILY
Qty: 1 | Refills: 0 | Status: DISCONTINUED | COMMUNITY
Start: 2022-08-12 | End: 2022-11-07

## 2022-11-07 NOTE — ASSESSMENT
[Medical Care Issues] : medical care issues [FreeTextEntry1] : resolved Monkey pox without sequela\par tolerating TRUVADA for PreExoposure Prophylaxis of HIV infection\par labs\par Menactra booster

## 2022-11-07 NOTE — PHYSICAL EXAM
[General Appearance - Alert] : alert [General Appearance - In No Acute Distress] : in no acute distress [General Appearance - Well-Appearing] : healthy appearing [Sclera] : the sclera and conjunctiva were normal [PERRL With Normal Accommodation] : pupils were equal in size, round, reactive to light [Extraocular Movements] : extraocular movements were intact [Outer Ear] : the ears and nose were normal in appearance [Oropharynx] : the oropharynx was normal with no thrush [Neck Appearance] : the appearance of the neck was normal [Neck Cervical Mass (___cm)] : no neck mass was observed [Jugular Venous Distention Increased] : there was no jugular-venous distention [Thyroid Diffuse Enlargement] : the thyroid was not enlarged [] : no respiratory distress [Auscultation Breath Sounds / Voice Sounds] : lungs were clear to auscultation bilaterally [Heart Rate And Rhythm] : heart rate was normal and rhythm regular [Heart Sounds] : normal S1 and S2 [Heart Sounds Gallop] : no gallops [Murmurs] : no murmurs [Heart Sounds Pericardial Friction Rub] : no pericardial rub [Edema] : there was no peripheral edema [Penis Abnormality] : the penis was normal [Scrotum] : the scrotum was normal [Testes Swelling] : the testicles were not swollen [No Penile Discharge] : no penile discharge [No Focal Deficits] : no focal deficits [Oriented To Time, Place, And Person] : oriented to person, place, and time [Affect] : the affect was normal

## 2022-11-07 NOTE — HISTORY OF PRESENT ILLNESS
[FreeTextEntry1] : Fine-\par \par Relieved that the severe pain and psychologic trauma of acute Monkey pox infection is completely resolved. There is NO SCARRING or pain in the involved areas. He had no adverse effects from Tecovirmat. He has resumed sexual activity. He has resumed TRUVADA regularly without adverse effects\par \par HE TOOK MONKEYPOX VACCINATION 9/15/22, 10/20/22\par \par Today he requests regularly labs, A1c\par s/p Menactra 4/23/13, 7/19/14 - agrees to Menactra booster\par s/p mild COVID 7/25/22 - s/p COVID vaccinations   3/22/21, 4/22/21  both MODERNA - no covid boosters, no flu shot\par lost 4# since 7/18/22  current weight = 201#  BMI = 28.03\par \par as previously noted:\par Mr Pena was h ospitalized [facility] 7/31 --> 8/6/22 with progressive and painful Monkeypox\par \par 31M on PREP admitted 7/31/22 with progressive lesions consistent with Monkey pox. He first noted rash in genital area on 7/25/22. He has developed lesions in right eyelids and progressive painful lesions at superpubic region, base of penis and shaft with increased pain. He has fever\par There has been near complete resolution of COVID related symptoms - he has slight cough, no sob\par \par Monkeypox PCR from 7/26/22 and 8/1/22 is POSITIVE confirming clinical diagnosis\par \par improvement evident as of 8/4 3rd day of Tecovirmat, though the severe pain with eschar removal was intense 8/4\par sustained improvement 8/5\par Tolerating Tecovirimat\par \par patient states he wants to go home\par current evidence indicates good immunity for at least 1 year after infection - not a monkeypox vaccine candidate at the present time\par \par On TPOXX trial - Continue Tecovirimat 600 mg - 3 pills twice daily 8/1-->\par  x14 day course \par Bactroban ointment to superpubic region - prescribed - he is using erhtryomycin ointment

## 2022-11-07 NOTE — CONSULT LETTER
[Dear  ___] : Dear  [unfilled], [Consult Letter:] : I had the pleasure of evaluating your patient, [unfilled]. [Please see my note below.] : Please see my note below. [Consult Closing:] : Thank you very much for allowing me to participate in the care of this patient.  If you have any questions, please do not hesitate to contact me. [Sincerely,] : Sincerely, [FreeTextEntry2] : Dr Wily Barkley\par [address]\[place] [address]  [FreeTextEntry3] : Matthew Goss MD, FACP, MELISA, AAHIVM\par Infectious Diseases\par [facility]

## 2022-11-09 LAB
ALBUMIN SERPL ELPH-MCNC: 4.6 G/DL
ALP BLD-CCNC: 72 U/L
ALT SERPL-CCNC: 29 U/L
ANION GAP SERPL CALC-SCNC: 10 MMOL/L
AST SERPL-CCNC: 19 U/L
BASOPHILS # BLD AUTO: 0.06 K/UL
BASOPHILS NFR BLD AUTO: 0.9 %
BILIRUB SERPL-MCNC: 0.7 MG/DL
BUN SERPL-MCNC: 11 MG/DL
C TRACH RRNA SPEC QL NAA+PROBE: NOT DETECTED
CALCIUM SERPL-MCNC: 10.1 MG/DL
CHLORIDE SERPL-SCNC: 103 MMOL/L
CO2 SERPL-SCNC: 27 MMOL/L
CREAT SERPL-MCNC: 1.01 MG/DL
EGFR: 103 ML/MIN/1.73M2
EOSINOPHIL # BLD AUTO: 0.16 K/UL
EOSINOPHIL NFR BLD AUTO: 2.3 %
ESTIMATED AVERAGE GLUCOSE: 111 MG/DL
GLUCOSE SERPL-MCNC: 98 MG/DL
HBA1C MFR BLD HPLC: 5.5 %
HCT VFR BLD CALC: 48.1 %
HGB BLD-MCNC: 15.6 G/DL
HIV1+2 AB SPEC QL IA.RAPID: NONREACTIVE
IMM GRANULOCYTES NFR BLD AUTO: 0.4 %
LYMPHOCYTES # BLD AUTO: 2.75 K/UL
LYMPHOCYTES NFR BLD AUTO: 40.3 %
MAN DIFF?: NORMAL
MCHC RBC-ENTMCNC: 27.3 PG
MCHC RBC-ENTMCNC: 32.4 GM/DL
MCV RBC AUTO: 84.2 FL
MONOCYTES # BLD AUTO: 0.55 K/UL
MONOCYTES NFR BLD AUTO: 8.1 %
N GONORRHOEA RRNA SPEC QL NAA+PROBE: NOT DETECTED
NEUTROPHILS # BLD AUTO: 3.28 K/UL
NEUTROPHILS NFR BLD AUTO: 48 %
PLATELET # BLD AUTO: 269 K/UL
POTASSIUM SERPL-SCNC: 4.9 MMOL/L
PROT SERPL-MCNC: 7.1 G/DL
RBC # BLD: 5.71 M/UL
RBC # FLD: 12.9 %
SODIUM SERPL-SCNC: 140 MMOL/L
SOURCE AMPLIFICATION: NORMAL
SOURCE ANAL: NORMAL
SOURCE ORAL: NORMAL
WBC # FLD AUTO: 6.83 K/UL

## 2022-11-10 LAB — T PALLIDUM AB SER QL IA: POSITIVE

## 2022-12-21 ENCOUNTER — APPOINTMENT (OUTPATIENT)
Dept: INFECTIOUS DISEASE | Facility: CLINIC | Age: 32
End: 2022-12-21

## 2022-12-21 DIAGNOSIS — Z20.2 CONTACT WITH AND (SUSPECTED) EXPOSURE TO INFECTIONS WITH A PREDOMINANTLY SEXUAL MODE OF TRANSMISSION: ICD-10-CM

## 2022-12-22 ENCOUNTER — NON-APPOINTMENT (OUTPATIENT)
Age: 32
End: 2022-12-22

## 2022-12-22 LAB
C TRACH RRNA SPEC QL NAA+PROBE: NOT DETECTED
C TRACH RRNA SPEC QL NAA+PROBE: NOT DETECTED
HIV1+2 AB SPEC QL IA.RAPID: NONREACTIVE
N GONORRHOEA RRNA SPEC QL NAA+PROBE: NOT DETECTED
N GONORRHOEA RRNA SPEC QL NAA+PROBE: NOT DETECTED
SOURCE AMPLIFICATION: NORMAL
SOURCE ANAL: NORMAL

## 2022-12-23 LAB
C TRACH RRNA SPEC QL NAA+PROBE: NOT DETECTED
N GONORRHOEA RRNA SPEC QL NAA+PROBE: NOT DETECTED
RPR SER-TITR: ABNORMAL
SOURCE ORAL: NORMAL

## 2023-01-17 ENCOUNTER — NON-APPOINTMENT (OUTPATIENT)
Age: 33
End: 2023-01-17

## 2023-01-18 ENCOUNTER — LABORATORY RESULT (OUTPATIENT)
Age: 33
End: 2023-01-18

## 2023-01-18 ENCOUNTER — APPOINTMENT (OUTPATIENT)
Dept: INFECTIOUS DISEASE | Facility: CLINIC | Age: 33
End: 2023-01-18
Payer: MEDICAID

## 2023-01-18 VITALS
DIASTOLIC BLOOD PRESSURE: 70 MMHG | TEMPERATURE: 97.8 F | SYSTOLIC BLOOD PRESSURE: 112 MMHG | WEIGHT: 193 LBS | BODY MASS INDEX: 27.02 KG/M2 | HEIGHT: 71 IN | HEART RATE: 60 BPM | OXYGEN SATURATION: 99 %

## 2023-01-18 DIAGNOSIS — R39.89 OTHER SYMPTOMS AND SIGNS INVOLVING THE GENITOURINARY SYSTEM: ICD-10-CM

## 2023-01-18 LAB
ALBUMIN SERPL ELPH-MCNC: 4.5 G/DL — SIGNIFICANT CHANGE UP (ref 3.3–5)
ALP SERPL-CCNC: 65 U/L — SIGNIFICANT CHANGE UP (ref 40–120)
ALT FLD-CCNC: 23 U/L — SIGNIFICANT CHANGE UP (ref 10–45)
ANION GAP SERPL CALC-SCNC: 10 MMOL/L — SIGNIFICANT CHANGE UP (ref 5–17)
APPEARANCE UR: CLEAR — SIGNIFICANT CHANGE UP
AST SERPL-CCNC: 18 U/L — SIGNIFICANT CHANGE UP (ref 10–40)
BACTERIA # UR AUTO: NEGATIVE — SIGNIFICANT CHANGE UP
BASOPHILS # BLD AUTO: 0.06 K/UL — SIGNIFICANT CHANGE UP (ref 0–0.2)
BASOPHILS NFR BLD AUTO: 0.8 % — SIGNIFICANT CHANGE UP (ref 0–2)
BILIRUB SERPL-MCNC: 0.7 MG/DL — SIGNIFICANT CHANGE UP (ref 0.2–1.2)
BILIRUB UR-MCNC: NEGATIVE — SIGNIFICANT CHANGE UP
BUN SERPL-MCNC: 13 MG/DL — SIGNIFICANT CHANGE UP (ref 7–23)
CALCIUM SERPL-MCNC: 10 MG/DL — SIGNIFICANT CHANGE UP (ref 8.4–10.5)
CHLORIDE SERPL-SCNC: 103 MMOL/L — SIGNIFICANT CHANGE UP (ref 96–108)
CO2 SERPL-SCNC: 28 MMOL/L — SIGNIFICANT CHANGE UP (ref 22–31)
COLOR SPEC: YELLOW — SIGNIFICANT CHANGE UP
CREAT SERPL-MCNC: 0.96 MG/DL — SIGNIFICANT CHANGE UP (ref 0.5–1.3)
DIFF PNL FLD: NEGATIVE — SIGNIFICANT CHANGE UP
EGFR: 108 ML/MIN/1.73M2 — SIGNIFICANT CHANGE UP
EOSINOPHIL # BLD AUTO: 0.14 K/UL — SIGNIFICANT CHANGE UP (ref 0–0.5)
EOSINOPHIL NFR BLD AUTO: 1.8 % — SIGNIFICANT CHANGE UP (ref 0–6)
EPI CELLS # UR: 1 /HPF — SIGNIFICANT CHANGE UP (ref 0–5)
GLUCOSE SERPL-MCNC: 108 MG/DL — HIGH (ref 70–99)
GLUCOSE UR QL: NEGATIVE — SIGNIFICANT CHANGE UP
HBV SURFACE AG SER-ACNC: SIGNIFICANT CHANGE UP
HCT VFR BLD CALC: 46.6 % — SIGNIFICANT CHANGE UP (ref 39–50)
HCV AB S/CO SERPL IA: 0.12 S/CO — SIGNIFICANT CHANGE UP (ref 0–0.99)
HCV AB SERPL-IMP: SIGNIFICANT CHANGE UP
HGB BLD-MCNC: 15.4 G/DL — SIGNIFICANT CHANGE UP (ref 13–17)
HIV 1+2 AB+HIV1 P24 AG SERPL QL IA: SIGNIFICANT CHANGE UP
HYALINE CASTS # UR AUTO: 0 /LPF — SIGNIFICANT CHANGE UP (ref 0–7)
IMM GRANULOCYTES NFR BLD AUTO: 0.4 % — SIGNIFICANT CHANGE UP (ref 0–0.9)
KETONES UR-MCNC: NEGATIVE — SIGNIFICANT CHANGE UP
LEUKOCYTE ESTERASE UR-ACNC: NEGATIVE — SIGNIFICANT CHANGE UP
LYMPHOCYTES # BLD AUTO: 1.74 K/UL — SIGNIFICANT CHANGE UP (ref 1–3.3)
LYMPHOCYTES # BLD AUTO: 22.5 % — SIGNIFICANT CHANGE UP (ref 13–44)
MCHC RBC-ENTMCNC: 28 PG — SIGNIFICANT CHANGE UP (ref 27–34)
MCHC RBC-ENTMCNC: 33 GM/DL — SIGNIFICANT CHANGE UP (ref 32–36)
MCV RBC AUTO: 84.7 FL — SIGNIFICANT CHANGE UP (ref 80–100)
MONOCYTES # BLD AUTO: 0.65 K/UL — SIGNIFICANT CHANGE UP (ref 0–0.9)
MONOCYTES NFR BLD AUTO: 8.4 % — SIGNIFICANT CHANGE UP (ref 2–14)
NEUTROPHILS # BLD AUTO: 5.12 K/UL — SIGNIFICANT CHANGE UP (ref 1.8–7.4)
NEUTROPHILS NFR BLD AUTO: 66.1 % — SIGNIFICANT CHANGE UP (ref 43–77)
NITRITE UR-MCNC: NEGATIVE — SIGNIFICANT CHANGE UP
PH UR: 7.5 — SIGNIFICANT CHANGE UP (ref 5–8)
PLATELET # BLD AUTO: 299 K/UL — SIGNIFICANT CHANGE UP (ref 150–400)
POTASSIUM SERPL-MCNC: 4.8 MMOL/L — SIGNIFICANT CHANGE UP (ref 3.5–5.3)
POTASSIUM SERPL-SCNC: 4.8 MMOL/L — SIGNIFICANT CHANGE UP (ref 3.5–5.3)
PROT SERPL-MCNC: 7 G/DL — SIGNIFICANT CHANGE UP (ref 6–8.3)
PROT UR-MCNC: NEGATIVE — SIGNIFICANT CHANGE UP
RBC # BLD: 5.5 M/UL — SIGNIFICANT CHANGE UP (ref 4.2–5.8)
RBC # FLD: 13.2 % — SIGNIFICANT CHANGE UP (ref 10.3–14.5)
RBC CASTS # UR COMP ASSIST: 4 /HPF — SIGNIFICANT CHANGE UP (ref 0–4)
SODIUM SERPL-SCNC: 140 MMOL/L — SIGNIFICANT CHANGE UP (ref 135–145)
SP GR SPEC: 1.02 — SIGNIFICANT CHANGE UP (ref 1.01–1.02)
UROBILINOGEN FLD QL: SIGNIFICANT CHANGE UP
WBC # BLD: 7.74 K/UL — SIGNIFICANT CHANGE UP (ref 3.8–10.5)
WBC # FLD AUTO: 7.74 K/UL — SIGNIFICANT CHANGE UP (ref 3.8–10.5)
WBC UR QL: 0 /HPF — SIGNIFICANT CHANGE UP (ref 0–5)

## 2023-01-18 PROCEDURE — 87340 HEPATITIS B SURFACE AG IA: CPT

## 2023-01-18 PROCEDURE — 86592 SYPHILIS TEST NON-TREP QUAL: CPT

## 2023-01-18 PROCEDURE — 99213 OFFICE O/P EST LOW 20 MIN: CPT

## 2023-01-18 PROCEDURE — 86695 HERPES SIMPLEX TYPE 1 TEST: CPT

## 2023-01-18 PROCEDURE — 86696 HERPES SIMPLEX TYPE 2 TEST: CPT

## 2023-01-18 PROCEDURE — 81001 URINALYSIS AUTO W/SCOPE: CPT

## 2023-01-18 PROCEDURE — 86780 TREPONEMA PALLIDUM: CPT

## 2023-01-18 PROCEDURE — 87491 CHLMYD TRACH DNA AMP PROBE: CPT

## 2023-01-18 PROCEDURE — 87255 GENET VIRUS ISOLATE HSV: CPT

## 2023-01-18 PROCEDURE — G0463: CPT

## 2023-01-18 PROCEDURE — 80053 COMPREHEN METABOLIC PANEL: CPT

## 2023-01-18 PROCEDURE — 86593 SYPHILIS TEST NON-TREP QUANT: CPT

## 2023-01-18 PROCEDURE — 87593 ORTHOPOXVIRUS AMP PRB EACH: CPT

## 2023-01-18 PROCEDURE — 86803 HEPATITIS C AB TEST: CPT

## 2023-01-18 PROCEDURE — 87591 N.GONORRHOEAE DNA AMP PROB: CPT

## 2023-01-18 PROCEDURE — 87389 HIV-1 AG W/HIV-1&-2 AB AG IA: CPT

## 2023-01-18 PROCEDURE — 85025 COMPLETE CBC W/AUTO DIFF WBC: CPT

## 2023-01-19 LAB
C TRACH RRNA SPEC QL NAA+PROBE: SIGNIFICANT CHANGE UP
C TRACH+GC RRNA ANAL QL PROBE: SIGNIFICANT CHANGE UP
C TRACH+GC RRNA SPEC QL PROBE: SIGNIFICANT CHANGE UP
CHLAMYDIA/N. GONORRHEA, ANAL/RECTAL, TMA - SOURCE ANAL: SIGNIFICANT CHANGE UP
CHLAMYDIA/N. GONORRHEA, ORAL/THROAT, TMA - SOURCE ORAL: SIGNIFICANT CHANGE UP
HSV1 IGG SER-ACNC: 36.9 INDEX — HIGH
HSV1 IGG SERPL QL IA: POSITIVE
HSV2 IGG FLD-ACNC: 11.1 INDEX — HIGH
HSV2 IGG SERPL QL IA: POSITIVE
N GONORRHOEA RRNA SPEC QL NAA+PROBE: SIGNIFICANT CHANGE UP
NONVAR ORTHPX DNA SPEC QL NAA+PROBE: SIGNIFICANT CHANGE UP
SPECIMEN SOURCE: SIGNIFICANT CHANGE UP

## 2023-01-20 DIAGNOSIS — B00.9 HERPESVIRAL INFECTION, UNSPECIFIED: ICD-10-CM

## 2023-01-20 NOTE — HISTORY OF PRESENT ILLNESS
[FreeTextEntry1] : History of Present Illness\par \par 1/18/23 SMILEY SIMPSON is a 32 year old male being seen for a follow-up visit. Came in today with lesions on shaft and head on penis x 3 days. he was using bacroban did not help. \par \par Plan 1/18/23--appears as possible herpes or syphilis lesions. non painful. \par all labs today including Monkey pox and HSV 1 -2 . Will check results before treatment. \par \par Relieved that the severe pain and psychologic trauma of acute Monkey pox infection is completely resolved. There is NO SCARRING or pain in the involved areas. He had no adverse effects from Tecovirmat. He has resumed sexual activity. He has resumed TRUVADA regularly without adverse effects\par \par HE TOOK MONKEYPOX VACCINATION 9/15/22, 10/20/22\par \par Today he requests regularly labs, A1c\par s/p Menactra 4/23/13, 7/19/14 - agrees to Menactra booster\par s/p mild COVID 7/25/22 - s/p COVID vaccinations 3/22/21, 4/22/21 both MODERNA - no covid boosters, no flu shot\par lost 4# since 7/18/22 current weight = 201# BMI = 28.03\par \par as previously noted:\par Mr Simpson was h ospitalized [facility] 7/31 --> 8/6/22 with progressive and painful Monkeypox\par \par 31M on PREP admitted 7/31/22 with progressive lesions consistent with Monkey pox. He first noted rash in genital area on 7/25/22. He has developed lesions in right eyelids and progressive painful lesions at superpubic region, base of penis and shaft with increased pain. He has fever\par There has been near complete resolution of COVID related symptoms - he has slight cough, no sob\par \par Monkeypox PCR from 7/26/22 and 8/1/22 is POSITIVE confirming clinical diagnosis\par \par improvement evident as of 8/4 3rd day of Tecovirmat, though the severe pain with eschar removal was intense 8/4\par sustained improvement 8/5\par Tolerating Tecovirimat\par \par patient states he wants to go home\par current evidence indicates good immunity for at least 1 year after infection - not a monkeypox vaccine candidate at the present time\par \par On TPOXX trial - Continue Tecovirimat 600 mg - 3 pills twice daily 8/1-->\par  x14 day course \par Bactroban ointment to superpubic region - prescribed - he is using erhtryomycin ointment \par  \par Active Problems\par Acute tonsillitis (463) (J03.90)\par Acute tonsillitis (463) (J03.90)\par Allergic rhinitis (477.9) (J30.9)\par Chlamydia (079.98) (A74.9)\par Eczema (692.9) (L30.9)\par Encounter for immunization (V03.89) (Z23)\par Encounter for preventive health examination (V70.0) (Z00.00)\par Genital sore (789.9) (R39.89)\par Gonorrhea (098.0) (A54.9)\par Hidradenitis suppurativa (705.83) (L73.2)\par History of RPR test (V15.89) (Z92.89)\par Human monkeypox (059.01) (B04)\par Obesity (BMI 30.0-34.9) (278.00) (E66.9)\par Obesity (BMI 35.0-39.9 without comorbidity) (278.00) (E66.9)\par Preoperative clearance (V72.84) (Z01.818)\par Screen for STD (sexually transmitted disease) (V74.5) (Z11.3)\par Screening for viral disease (V73.99) (Z11.59)\par Syphilis (097.9) (A53.9)\par 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)\par Urethritis (597.80) (N34.2)\par \par Past Medical History\par History of COVID-19 (079.89) (U07.1)\par History of acute pharyngitis (V12.69) (Z87.09)\par \par Current Meds\par Allegra Allergy Childrens 30 MG Oral Tablet Disintegrating\par Benzoyl Peroxide-Erythromycin 5-3 % External Gel\par Betamethasone Dipropionate Aug 0.05 % External Cream\par Clindamycin Phosphate 1 % External Solution; APPLY THIN COAT TO AFFECTED AREA\par TWICE A DAY\par Emtricitabine-Tenofovir -300 MG Oral Tablet; TAKE 1 TABLET BY MOUTH DAILY\par Erythromycin 2 % External Gel; APPLY AND RUB IN A THIN FILM TO AFFECTED AREAS\par TWICE DAILY.(AM AND PM)\par Erythromycin 2 % External Solution\par Hydrocortisone 2.5 % External Ointment; apply twice a day TO THE NECK\par Lysine 500 MG CAPS\par Mupirocin 2 % External Ointment\par Tpoxx 200 MG Oral Capsule\par Triamcinolone Acetonide 0.1 % External Ointment; APPLY THIN COAT TO AFFECTED\par AREA TWICE A DAY\par Triamcinolone Acetonide 55 MCG/ACT Nasal Aerosol; INSTILL 2 SPRAYS INTO EACH\par NOSTRIL EVERY DAY FOR 30 DAYS\par valACYclovir HCl - 1 GM Oral Tablet; Take 1 tablet every 12 hours\par Vitamin C 500 MG Oral Capsule\par Vitamin C ER 1000 MG TBCR\par Zinc 25 MG TABS\par \par Allergies\par Amoxicillin TABS\par Cobalt Chloride Hexahydrate PARISH\par Neosporin OINT\par Penicillins\par Thimerosal\par \par \par  \par Active Problems\par Acute tonsillitis (463) (J03.90)\par Acute tonsillitis (463) (J03.90)\par Allergic rhinitis (477.9) (J30.9)\par Chlamydia (079.98) (A74.9)\par Eczema (692.9) (L30.9)\par Encounter for immunization (V03.89) (Z23)\par Encounter for preventive health examination (V70.0) (Z00.00)\par Exposure to chlamydia (V01.6) (Z20.2)\par Genital sore (789.9) (R39.89)\par Gonorrhea (098.0) (A54.9)\par Hidradenitis suppurativa (705.83) (L73.2)\par History of RPR test (V15.89) (Z92.89)\par Human monkeypox (059.01) (B04)\par Obesity (BMI 30.0-34.9) (278.00) (E66.9)\par Obesity (BMI 35.0-39.9 without comorbidity) (278.00) (E66.9)\par Preoperative clearance (V72.84) (Z01.818)\par Screen for STD (sexually transmitted disease) (V74.5) (Z11.3)\par Screening for viral disease (V73.99) (Z11.59)\par Syphilis (097.9) (A53.9)\par 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)\par Urethritis (597.80) (N34.2)\par \par Past Medical History\par History of COVID-19 (079.89) (U07.1)\par History of acute pharyngitis (V12.69) (Z87.09)\par \par Current Meds\par Allegra Allergy Childrens 30 MG Oral Tablet Disintegrating\par Benzoyl Peroxide-Erythromycin 5-3 % External Gel\par Betamethasone Dipropionate Aug 0.05 % External Cream\par Clindamycin Phosphate 1 % External Solution; APPLY THIN COAT TO AFFECTED AREA\par TWICE A DAY\par Doxycycline Hyclate 100 MG Oral Capsule; take 1 capsule by mouth twice a day\par Emtricitabine-Tenofovir -300 MG Oral Tablet; TAKE 1 TABLET BY MOUTH  DAILY\par Erythromycin 2 % External Solution\par Hydrocortisone 2.5 % External Ointment; apply twice a day TO THE NECK\par Lysine 500 MG CAPS\par Mupirocin 2 % External Ointment\par Tpoxx 200 MG Oral Capsule\par Triamcinolone Acetonide 0.1 % External Ointment; APPLY THIN COAT TO AFFECTED\par AREA TWICE A DAY\par Triamcinolone Acetonide 55 MCG/ACT Nasal Aerosol; INSTILL 2 SPRAYS INTO EACH\par NOSTRIL EVERY DAY FOR 30 DAYS\par valACYclovir HCl - 1 GM Oral Tablet; Take 1 tablet every 12 hours\par Vitamin C 500 MG Oral Capsule\par Vitamin C ER 1000 MG TBCR\par Zinc 25 MG TABS\par \par Allergies\par Amoxicillin TABS\par Cobalt Chloride Hexahydrate PARISH\par Neosporin OINT\par Penicillins\par Thimerosal\par \par Review of Systems\par \par Constitutional, Eyes, ENT, Cardiovascular, Respiratory, Gastrointestinal, Genitourinary, Musculoskeletal, Integumentary, Neurological, Psychiatric and Heme/Lymph are otherwise negative. \par  \par Vitals\par Vital Signs \par 	Recorded: 18Jan2023 10:48AM\par Systolic	112\par Diastolic	70\par Height	5 ft 11 in\par Weight	193 lb \par BMI Calculated	26.92 kg/m2\par BSA Calculated	2.08\par Temperature	97.8 F\par Heart Rate	60\par O2 Saturation	99\par \par Assessment\par \par Prep patient. \par \par  \par Plan\par Plan 1/18/23--appears as possible herpes or syphilis lesions. non painful. \par all labs today including Monkey pox and HSV 1 -2 . Will check results before treatment. \par \par Electronically signed by : CARLIE MCDANIELS N.P.; Jan 18 2023 11:11AM EST (Author)\par

## 2023-01-20 NOTE — HISTORY OF PRESENT ILLNESS
Outreach attempt was made to schedule a Medicare Wellness Visit. This was the second attempt. Contact was made, [facility] appointment scheduled. Apt completed in [place], pt's wife will bring documentation.     [FreeTextEntry1] : History of Present Illness\par \par 1/18/23 SMILEY SMIPSON is a 32 year old male being seen for a follow-up visit. Came in today with lesions on shaft and head on penis x 3 days. he was using bacroban did not help. \par \par Plan 1/18/23--appears as possible herpes or syphilis lesions. non painful. \par all labs today including Monkey pox and HSV 1 -2 . Will check results before treatment. \par \par Relieved that the severe pain and psychologic trauma of acute Monkey pox infection is completely resolved. There is NO SCARRING or pain in the involved areas. He had no adverse effects from Tecovirmat. He has resumed sexual activity. He has resumed TRUVADA regularly without adverse effects\par \par HE TOOK MONKEYPOX VACCINATION 9/15/22, 10/20/22\par \par Today he requests regularly labs, A1c\par s/p Menactra 4/23/13, 7/19/14 - agrees to Menactra booster\par s/p mild COVID 7/25/22 - s/p COVID vaccinations 3/22/21, 4/22/21 both MODERNA - no covid boosters, no flu shot\par lost 4# since 7/18/22 current weight = 201# BMI = 28.03\par \par as previously noted:\par Mr Simpson was h ospitalized [facility] 7/31 --> 8/6/22 with progressive and painful Monkeypox\par \par 31M on PREP admitted 7/31/22 with progressive lesions consistent with Monkey pox. He first noted rash in genital area on 7/25/22. He has developed lesions in right eyelids and progressive painful lesions at superpubic region, base of penis and shaft with increased pain. He has fever\par There has been near complete resolution of COVID related symptoms - he has slight cough, no sob\par \par Monkeypox PCR from 7/26/22 and 8/1/22 is POSITIVE confirming clinical diagnosis\par \par improvement evident as of 8/4 3rd day of Tecovirmat, though the severe pain with eschar removal was intense 8/4\par sustained improvement 8/5\par Tolerating Tecovirimat\par \par patient states he wants to go home\par current evidence indicates good immunity for at least 1 year after infection - not a monkeypox vaccine candidate at the present time\par \par On TPOXX trial - Continue Tecovirimat 600 mg - 3 pills twice daily 8/1-->\par  x14 day course \par Bactroban ointment to superpubic region - prescribed - he is using erhtryomycin ointment \par  \par Active Problems\par Acute tonsillitis (463) (J03.90)\par Acute tonsillitis (463) (J03.90)\par Allergic rhinitis (477.9) (J30.9)\par Chlamydia (079.98) (A74.9)\par Eczema (692.9) (L30.9)\par Encounter for immunization (V03.89) (Z23)\par Encounter for preventive health examination (V70.0) (Z00.00)\par Genital sore (789.9) (R39.89)\par Gonorrhea (098.0) (A54.9)\par Hidradenitis suppurativa (705.83) (L73.2)\par History of RPR test (V15.89) (Z92.89)\par Human monkeypox (059.01) (B04)\par Obesity (BMI 30.0-34.9) (278.00) (E66.9)\par Obesity (BMI 35.0-39.9 without comorbidity) (278.00) (E66.9)\par Preoperative clearance (V72.84) (Z01.818)\par Screen for STD (sexually transmitted disease) (V74.5) (Z11.3)\par Screening for viral disease (V73.99) (Z11.59)\par Syphilis (097.9) (A53.9)\par 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)\par Urethritis (597.80) (N34.2)\par \par Past Medical History\par History of COVID-19 (079.89) (U07.1)\par History of acute pharyngitis (V12.69) (Z87.09)\par \par Current Meds\par Allegra Allergy Childrens 30 MG Oral Tablet Disintegrating\par Benzoyl Peroxide-Erythromycin 5-3 % External Gel\par Betamethasone Dipropionate Aug 0.05 % External Cream\par Clindamycin Phosphate 1 % External Solution; APPLY THIN COAT TO AFFECTED AREA\par TWICE A DAY\par Emtricitabine-Tenofovir -300 MG Oral Tablet; TAKE 1 TABLET BY MOUTH DAILY\par Erythromycin 2 % External Gel; APPLY AND RUB IN A THIN FILM TO AFFECTED AREAS\par TWICE DAILY.(AM AND PM)\par Erythromycin 2 % External Solution\par Hydrocortisone 2.5 % External Ointment; apply twice a day TO THE NECK\par Lysine 500 MG CAPS\par Mupirocin 2 % External Ointment\par Tpoxx 200 MG Oral Capsule\par Triamcinolone Acetonide 0.1 % External Ointment; APPLY THIN COAT TO AFFECTED\par AREA TWICE A DAY\par Triamcinolone Acetonide 55 MCG/ACT Nasal Aerosol; INSTILL 2 SPRAYS INTO EACH\par NOSTRIL EVERY DAY FOR 30 DAYS\par valACYclovir HCl - 1 GM Oral Tablet; Take 1 tablet every 12 hours\par Vitamin C 500 MG Oral Capsule\par Vitamin C ER 1000 MG TBCR\par Zinc 25 MG TABS\par \par Allergies\par Amoxicillin TABS\par Cobalt Chloride Hexahydrate PARISH\par Neosporin OINT\par Penicillins\par Thimerosal\par \par \par  \par Active Problems\par Acute tonsillitis (463) (J03.90)\par Acute tonsillitis (463) (J03.90)\par Allergic rhinitis (477.9) (J30.9)\par Chlamydia (079.98) (A74.9)\par Eczema (692.9) (L30.9)\par Encounter for immunization (V03.89) (Z23)\par Encounter for preventive health examination (V70.0) (Z00.00)\par Exposure to chlamydia (V01.6) (Z20.2)\par Genital sore (789.9) (R39.89)\par Gonorrhea (098.0) (A54.9)\par Hidradenitis suppurativa (705.83) (L73.2)\par History of RPR test (V15.89) (Z92.89)\par Human monkeypox (059.01) (B04)\par Obesity (BMI 30.0-34.9) (278.00) (E66.9)\par Obesity (BMI 35.0-39.9 without comorbidity) (278.00) (E66.9)\par Preoperative clearance (V72.84) (Z01.818)\par Screen for STD (sexually transmitted disease) (V74.5) (Z11.3)\par Screening for viral disease (V73.99) (Z11.59)\par Syphilis (097.9) (A53.9)\par 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)\par Urethritis (597.80) (N34.2)\par \par Past Medical History\par History of COVID-19 (079.89) (U07.1)\par History of acute pharyngitis (V12.69) (Z87.09)\par \par Current Meds\par Allegra Allergy Childrens 30 MG Oral Tablet Disintegrating\par Benzoyl Peroxide-Erythromycin 5-3 % External Gel\par Betamethasone Dipropionate Aug 0.05 % External Cream\par Clindamycin Phosphate 1 % External Solution; APPLY THIN COAT TO AFFECTED AREA\par TWICE A DAY\par Doxycycline Hyclate 100 MG Oral Capsule; take 1 capsule by mouth twice a day\par Emtricitabine-Tenofovir -300 MG Oral Tablet; TAKE 1 TABLET BY MOUTH  DAILY\par Erythromycin 2 % External Solution\par Hydrocortisone 2.5 % External Ointment; apply twice a day TO THE NECK\par Lysine 500 MG CAPS\par Mupirocin 2 % External Ointment\par Tpoxx 200 MG Oral Capsule\par Triamcinolone Acetonide 0.1 % External Ointment; APPLY THIN COAT TO AFFECTED\par AREA TWICE A DAY\par Triamcinolone Acetonide 55 MCG/ACT Nasal Aerosol; INSTILL 2 SPRAYS INTO EACH\par NOSTRIL EVERY DAY FOR 30 DAYS\par valACYclovir HCl - 1 GM Oral Tablet; Take 1 tablet every 12 hours\par Vitamin C 500 MG Oral Capsule\par Vitamin C ER 1000 MG TBCR\par Zinc 25 MG TABS\par \par Allergies\par Amoxicillin TABS\par Cobalt Chloride Hexahydrate PARISH\par Neosporin OINT\par Penicillins\par Thimerosal\par \par Review of Systems\par \par Constitutional, Eyes, ENT, Cardiovascular, Respiratory, Gastrointestinal, Genitourinary, Musculoskeletal, Integumentary, Neurological, Psychiatric and Heme/Lymph are otherwise negative. \par  \par Vitals\par Vital Signs \par 	Recorded: 18Jan2023 10:48AM\par Systolic	112\par Diastolic	70\par Height	5 ft 11 in\par Weight	193 lb \par BMI Calculated	26.92 kg/m2\par BSA Calculated	2.08\par Temperature	97.8 F\par Heart Rate	60\par O2 Saturation	99\par \par Assessment\par \par Prep patient. \par \par  \par Plan\par Plan 1/18/23--appears as possible herpes or syphilis lesions. non painful. \par all labs today including Monkey pox and HSV 1 -2 . Will check results before treatment. \par \par Electronically signed by : CARLIE MCDANIELS N.P.; Jan 18 2023 11:11AM EST (Author)\par

## 2023-01-21 LAB
HSV1 AB FLD QL: NEGATIVE — SIGNIFICANT CHANGE UP
HSV2 AB FLD-ACNC: NEGATIVE — SIGNIFICANT CHANGE UP

## 2023-01-22 LAB
HERPES SIMPLEX VIRUS 1 AG: SIGNIFICANT CHANGE UP
HERPES SIMPLEX VIRUS 2 AG: SIGNIFICANT CHANGE UP
HHV SPEC CULT: SIGNIFICANT CHANGE UP
HSV1 AG SPEC QL: SIGNIFICANT CHANGE UP
HSV2 AG SPEC QL: SIGNIFICANT CHANGE UP

## 2023-01-23 LAB
RPR SER-TITR: (no result)
RPR SERPL-ACNC: REACTIVE
T PALLIDUM AB TITR SER: POSITIVE

## 2023-01-27 ENCOUNTER — NON-APPOINTMENT (OUTPATIENT)
Age: 33
End: 2023-01-27

## 2023-01-30 ENCOUNTER — NON-APPOINTMENT (OUTPATIENT)
Age: 33
End: 2023-01-30

## 2023-01-31 NOTE — PATIENT PROFILE ADULT - OVER THE PAST TWO WEEKS HAVE YOU FELT DOWN, DEPRESSED OR HOPELESS?
Preventive Health Recommendations  Male Ages 21 - 25     Yearly exam:             See your health care provider every year in order to  o   Review health changes.   o   Discuss preventive care.    o   Review your medicines if your doctor has prescribed any.    You should be tested each year for STDs (sexually transmitted diseases).     Talk to your provider about cholesterol testing.      If you are at risk for diabetes, you should have a diabetes test (fasting glucose).    Shots: Get a flu shot each year. Get a tetanus shot every 10 years.     Nutrition:    Eat at least 5 servings of fruits and vegetables daily.     Eat whole-grain bread, whole-wheat pasta and brown rice instead of white grains and rice.     Get adequate calcium and Vitamin D.     Lifestyle    Exercise for at least 150 minutes a week (30 minutes a day, 5 days a week). This will help you control your weight and prevent disease.     Limit alcohol to one drink per day.     No smoking.     Wear sunscreen to prevent skin cancer.     See your dentist every six months for an exam and cleaning.     
no

## 2023-02-07 ENCOUNTER — NON-APPOINTMENT (OUTPATIENT)
Age: 33
End: 2023-02-07

## 2023-02-09 ENCOUNTER — RX RENEWAL (OUTPATIENT)
Age: 33
End: 2023-02-09

## 2023-02-13 ENCOUNTER — LABORATORY RESULT (OUTPATIENT)
Age: 33
End: 2023-02-13

## 2023-02-13 ENCOUNTER — APPOINTMENT (OUTPATIENT)
Dept: INFECTIOUS DISEASE | Facility: CLINIC | Age: 33
End: 2023-02-13
Payer: MEDICAID

## 2023-02-13 VITALS
DIASTOLIC BLOOD PRESSURE: 75 MMHG | WEIGHT: 193 LBS | HEIGHT: 71 IN | HEART RATE: 74 BPM | BODY MASS INDEX: 27.02 KG/M2 | SYSTOLIC BLOOD PRESSURE: 122 MMHG | OXYGEN SATURATION: 98 % | TEMPERATURE: 97.6 F

## 2023-02-13 DIAGNOSIS — Z92.89 PERSONAL HISTORY OF OTHER MEDICAL TREATMENT: ICD-10-CM

## 2023-02-13 PROCEDURE — 99213 OFFICE O/P EST LOW 20 MIN: CPT

## 2023-02-13 NOTE — CONSULT LETTER
[Dear  ___] : Dear  [unfilled], [Consult Letter:] : I had the pleasure of evaluating your patient, [unfilled]. [Please see my note below.] : Please see my note below. [Consult Closing:] : Thank you very much for allowing me to participate in the care of this patient.  If you have any questions, please do not hesitate to contact me. [Sincerely,] : Sincerely, [FreeTextEntry2] : Dr Wily Barkley\par [address]\[place] [address] [FreeTextEntry3] : Matthew Goss MD, FACP, MELISA, AAHIVM\par Infectious Diseases\par [facility]

## 2023-02-13 NOTE — PHYSICAL EXAM
[General Appearance - Alert] : alert [General Appearance - In No Acute Distress] : in no acute distress [Sclera] : the sclera and conjunctiva were normal [PERRL With Normal Accommodation] : pupils were equal in size, round, reactive to light [Extraocular Movements] : extraocular movements were intact [Outer Ear] : the ears and nose were normal in appearance [Oropharynx] : the oropharynx was normal with no thrush [] : no respiratory distress [Auscultation Breath Sounds / Voice Sounds] : lungs were clear to auscultation bilaterally [Heart Rate And Rhythm] : heart rate was normal and rhythm regular [Heart Sounds] : normal S1 and S2 [Heart Sounds Gallop] : no gallops [Murmurs] : no murmurs [Heart Sounds Pericardial Friction Rub] : no pericardial rub [Edema] : there was no peripheral edema

## 2023-02-13 NOTE — ASSESSMENT
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 He will consider in future\par \par \par  [Medical Care Issues] : medical care issues

## 2023-02-13 NOTE — HISTORY OF PRESENT ILLNESS
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 He first noted rash in genital area on 7/25/22. He has developed lesions in right eyelids and progressive painful lesions at superpubic region, base of penis and shaft with increased pain. He has fever\par There has been near complete resolution of COVID related symptoms - he had slight cough, no sob\par Monkeypox PCR from 7/26/22 and 8/1/22 is POSITIVE confirming clinical diagnosis\par improvement evident as of 8/4 3rd day of Tecovirmat, though the severe pain with eschar removal was intense 8/4 -sustained improvement 8/ - Tolerating Tecovirimat\par Lesions have healed without scars. [Sexually Active] : The patient is sexually active

## 2023-02-14 LAB
C TRACH RRNA SPEC QL NAA+PROBE: NOT DETECTED
HBV SURFACE AG SER QL: NONREACTIVE
HCV AB SER QL: NONREACTIVE
HCV S/CO RATIO: 0.13 S/CO
HIV1+2 AB SPEC QL IA.RAPID: NONREACTIVE
HSV 1+2 IGG SER IA-IMP: POSITIVE
HSV 1+2 IGG SER IA-IMP: POSITIVE
HSV1 IGG SER QL: 33.3 INDEX
HSV2 IGG SER QL: 7.62 INDEX
N GONORRHOEA RRNA SPEC QL NAA+PROBE: NOT DETECTED
SOURCE AMPLIFICATION: NORMAL
SOURCE ANAL: NORMAL
SOURCE ORAL: NORMAL

## 2023-02-17 LAB — T PALLIDUM AB SER QL IA: POSITIVE

## 2023-05-15 ENCOUNTER — LABORATORY RESULT (OUTPATIENT)
Age: 33
End: 2023-05-15

## 2023-05-15 ENCOUNTER — OUTPATIENT (OUTPATIENT)
Dept: OUTPATIENT SERVICES | Facility: HOSPITAL | Age: 33
LOS: 1 days | End: 2023-05-15
Payer: MEDICAID

## 2023-05-15 ENCOUNTER — APPOINTMENT (OUTPATIENT)
Dept: INFECTIOUS DISEASE | Facility: CLINIC | Age: 33
End: 2023-05-15
Payer: MEDICAID

## 2023-05-15 VITALS
TEMPERATURE: 97.8 F | HEIGHT: 71 IN | DIASTOLIC BLOOD PRESSURE: 71 MMHG | WEIGHT: 194 LBS | BODY MASS INDEX: 27.16 KG/M2 | SYSTOLIC BLOOD PRESSURE: 136 MMHG | HEART RATE: 65 BPM | OXYGEN SATURATION: 98 %

## 2023-05-15 DIAGNOSIS — B97.89 OTHER VIRAL AGENTS AS THE CAUSE OF DISEASES CLASSIFIED ELSEWHERE: ICD-10-CM

## 2023-05-15 DIAGNOSIS — L30.9 DERMATITIS, UNSPECIFIED: ICD-10-CM

## 2023-05-15 PROCEDURE — G0463: CPT

## 2023-05-15 PROCEDURE — 99214 OFFICE O/P EST MOD 30 MIN: CPT

## 2023-05-15 RX ORDER — DOXYCYCLINE 100 MG/1
100 CAPSULE ORAL AS DIRECTED
Qty: 30 | Refills: 3 | Status: DISCONTINUED | COMMUNITY
Start: 2023-05-15 | End: 2023-05-15

## 2023-05-15 NOTE — HISTORY OF PRESENT ILLNESS
[Sexually Active] : The patient is sexually active [FreeTextEntry1] : feels well\par no intercurrent illnesses\par excercising\par anxious to hear about small cluster of MPOX in [place] in vaccinated persons\par notes ecxema and skin irritation\par weight stable  194#  BMI = 27\par \par 2/13/23 PRP 1:2   Rx: Doxy 100 BID x 10 days\par \par \par As previously noted:\par On 1/18/23, he was seen for penile lesions, viral swab, swab for Monkey pox were negative as were triple site GC/Chlamydia; HIV negative\par RPR was positive 1:2 he was treated with Docycyline 100 mg po BID x 10 days\par He previously was treated for syphilis in past on 12/7/14 RPR 1:64, RPR has been negative\par 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 He first noted rash in genital area on 7/25/22. He has developed lesions in right eyelids and progressive painful lesions at superpubic region, base of penis and shaft with increased pain. He has fever\par There has been near complete resolution of COVID related symptoms - he had slight cough, no sob\par Monkeypox PCR from 7/26/22 and 8/1/22 is POSITIVE confirming clinical diagnosis\par improvement evident as of 8/4 3rd day of Tecovirmat, though the severe pain with eschar removal was intense 8/4 -sustained improvement 8/ - Tolerating Tecovirimat\par Lesions have healed without scars. \par \par

## 2023-05-15 NOTE — PHYSICAL EXAM
[General Appearance - Alert] : alert [General Appearance - In No Acute Distress] : in no acute distress [General Appearance - Well Nourished] : well nourished [General Appearance - Well-Appearing] : healthy appearing [Sclera] : the sclera and conjunctiva were normal [Extraocular Movements] : extraocular movements were intact [Outer Ear] : the ears and nose were normal in appearance [Neck Appearance] : the appearance of the neck was normal [Neck Cervical Mass (___cm)] : no neck mass was observed [Jugular Venous Distention Increased] : there was no jugular-venous distention [Thyroid Diffuse Enlargement] : the thyroid was not enlarged [Auscultation Breath Sounds / Voice Sounds] : lungs were clear to auscultation bilaterally [Heart Rate And Rhythm] : heart rate was normal and rhythm regular [Heart Sounds] : normal S1 and S2 [Heart Sounds Gallop] : no gallops [Murmurs] : no murmurs [Heart Sounds Pericardial Friction Rub] : no pericardial rub [Edema] : there was no peripheral edema [Bowel Sounds] : normal bowel sounds [Abdomen Soft] : soft [Abdomen Tenderness] : non-tender [] : no hepato-splenomegaly [Abdomen Mass (___ Cm)] : no abdominal mass palpated [No Palpable Adenopathy] : no palpable adenopathy [Musculoskeletal - Swelling] : no joint swelling [Nail Clubbing] : no clubbing  or cyanosis of the fingernails [Motor Tone] : muscle strength and tone were normal [No Focal Deficits] : no focal deficits [Oriented To Time, Place, And Person] : oriented to person, place, and time [Affect] : the affect was normal

## 2023-05-15 NOTE — CONSULT LETTER
[Dear  ___] : Dear  [unfilled], [Consult Letter:] : I had the pleasure of evaluating your patient, [unfilled]. [Please see my note below.] : Please see my note below. [Consult Closing:] : Thank you very much for allowing me to participate in the care of this patient.  If you have any questions, please do not hesitate to contact me. [Sincerely,] : Sincerely, [FreeTextEntry2] : Dr Wily Barkley\par [address]\[place] [address] [FreeTextEntry3] : Matthew Goss MD, FACP, MELISA, AAHIVM\par Infectious Diseases\par [facility]

## 2023-05-15 NOTE — ASSESSMENT
[Treatment Education] : treatment education [Treatment Adherence] : treatment adherence [Rx Dose / Side Effects] : Rx dose/side effects [Medical Care Issues] : medical care issues [FreeTextEntry1] : appears well\par tolerating Pre Exposure HIV Prophylaxis with Truvada one pill daily\par \par alternate PREP strategy with Cabotegravir (APRETUDE) 200 mg im injection every 2 months discussed - potential advantage of avoiding Tenofovir Disproxil Fumerate adverse effects specifically mentioned. He prefers the daily pills.\par \par "DOXYprep" discussed. Taking Doxycline 200 mg within 24hours -  up to 72 hours is associated with significant decrease in bacterial STIs and was well tolerated\par (Hussain et al. Postexposure Doxycycline to Prevent Bacterial Sexually Transmitted Infections P [place] Journal of Medicine PG  - 4416-8333 VI  - 388 IP  - 14 AID  - [id number] [doi] 4100  - https://www.nejm.org/doi/full/10.1056/[email or web address] SO  - [place] Journal of Medicine April 6, 2023 38814):1296)\par In addition meninogoccal vaccination has been associated with signficant decrease in gonococcal infections - He is uptodate.\par \par Mr Pena is interested in health promotion and disease prevention\par \par labs\par Doxy 200 mg po within 24-72 hrs of unprotected sex prescribed\par

## 2023-05-16 DIAGNOSIS — Z00.00 ENCOUNTER FOR GENERAL ADULT MEDICAL EXAMINATION WITHOUT ABNORMAL FINDINGS: ICD-10-CM

## 2023-05-16 DIAGNOSIS — L30.9 DERMATITIS, UNSPECIFIED: ICD-10-CM

## 2023-05-17 ENCOUNTER — NON-APPOINTMENT (OUTPATIENT)
Age: 33
End: 2023-05-17

## 2023-05-18 LAB
ALBUMIN SERPL ELPH-MCNC: 4.4 G/DL
ALP BLD-CCNC: 83 U/L
ALT SERPL-CCNC: 27 U/L
ANION GAP SERPL CALC-SCNC: 13 MMOL/L
AST SERPL-CCNC: 19 U/L
BASOPHILS # BLD AUTO: 0.06 K/UL
BASOPHILS NFR BLD AUTO: 0.9 %
BILIRUB SERPL-MCNC: 0.4 MG/DL
BUN SERPL-MCNC: 14 MG/DL
C TRACH RRNA SPEC QL NAA+PROBE: NOT DETECTED
CALCIUM SERPL-MCNC: 9.6 MG/DL
CHLORIDE SERPL-SCNC: 104 MMOL/L
CHOLEST SERPL-MCNC: 109 MG/DL
CO2 SERPL-SCNC: 23 MMOL/L
CREAT SERPL-MCNC: 0.87 MG/DL
EGFR: 118 ML/MIN/1.73M2
EOSINOPHIL # BLD AUTO: 0.05 K/UL
EOSINOPHIL NFR BLD AUTO: 0.7 %
ESTIMATED AVERAGE GLUCOSE: 108 MG/DL
GLUCOSE SERPL-MCNC: 94 MG/DL
HBA1C MFR BLD HPLC: 5.4 %
HCT VFR BLD CALC: 47.5 %
HDLC SERPL-MCNC: 38 MG/DL
HGB BLD-MCNC: 15.3 G/DL
HIV1+2 AB SPEC QL IA.RAPID: NONREACTIVE
HSV 1+2 IGG SER IA-IMP: POSITIVE
HSV 1+2 IGG SER IA-IMP: POSITIVE
HSV1 IGG SER QL: 34.1 INDEX
HSV2 IGG SER QL: 2.49 INDEX
IMM GRANULOCYTES NFR BLD AUTO: 0.3 %
LDLC SERPL CALC-MCNC: 62 MG/DL
LYMPHOCYTES # BLD AUTO: 1.82 K/UL
LYMPHOCYTES NFR BLD AUTO: 26.5 %
MAN DIFF?: NORMAL
MCHC RBC-ENTMCNC: 28.5 PG
MCHC RBC-ENTMCNC: 32.2 GM/DL
MCV RBC AUTO: 88.5 FL
MONOCYTES # BLD AUTO: 0.59 K/UL
MONOCYTES NFR BLD AUTO: 8.6 %
N GONORRHOEA RRNA SPEC QL NAA+PROBE: NOT DETECTED
NEUTROPHILS # BLD AUTO: 4.34 K/UL
NEUTROPHILS NFR BLD AUTO: 63 %
NONHDLC SERPL-MCNC: 71 MG/DL
PLATELET # BLD AUTO: 276 K/UL
POTASSIUM SERPL-SCNC: 4.8 MMOL/L
PROT SERPL-MCNC: 6.7 G/DL
RBC # BLD: 5.37 M/UL
RBC # FLD: 12.6 %
SODIUM SERPL-SCNC: 139 MMOL/L
SOURCE AMPLIFICATION: NORMAL
SOURCE ANAL: NORMAL
SOURCE ORAL: NORMAL
T PALLIDUM AB SER QL IA: POSITIVE
TRIGL SERPL-MCNC: 45 MG/DL
WBC # FLD AUTO: 6.88 K/UL

## 2023-05-24 ENCOUNTER — NON-APPOINTMENT (OUTPATIENT)
Age: 33
End: 2023-05-24

## 2023-08-03 ENCOUNTER — RX RENEWAL (OUTPATIENT)
Age: 33
End: 2023-08-03

## 2023-08-14 ENCOUNTER — NON-APPOINTMENT (OUTPATIENT)
Age: 33
End: 2023-08-14

## 2023-08-14 ENCOUNTER — OUTPATIENT (OUTPATIENT)
Dept: OUTPATIENT SERVICES | Facility: HOSPITAL | Age: 33
LOS: 1 days | End: 2023-08-14
Payer: MEDICAID

## 2023-08-14 ENCOUNTER — APPOINTMENT (OUTPATIENT)
Dept: INFECTIOUS DISEASE | Facility: CLINIC | Age: 33
End: 2023-08-14
Payer: MEDICAID

## 2023-08-14 ENCOUNTER — LABORATORY RESULT (OUTPATIENT)
Age: 33
End: 2023-08-14

## 2023-08-14 VITALS
HEART RATE: 98 BPM | TEMPERATURE: 97.7 F | DIASTOLIC BLOOD PRESSURE: 65 MMHG | SYSTOLIC BLOOD PRESSURE: 115 MMHG | WEIGHT: 195 LBS | OXYGEN SATURATION: 97 % | HEIGHT: 71 IN | BODY MASS INDEX: 27.3 KG/M2

## 2023-08-14 DIAGNOSIS — B97.89 OTHER VIRAL AGENTS AS THE CAUSE OF DISEASES CLASSIFIED ELSEWHERE: ICD-10-CM

## 2023-08-14 PROCEDURE — 99213 OFFICE O/P EST LOW 20 MIN: CPT

## 2023-08-14 PROCEDURE — G0463: CPT

## 2023-08-14 NOTE — ASSESSMENT
[FreeTextEntry1] : Toelrating PREP  doing well has healthy lifestyle  labs/screening today [Medical Care Issues] : medical care issues

## 2023-08-14 NOTE — PHYSICAL EXAM
[General Appearance - Alert] : alert [General Appearance - In No Acute Distress] : in no acute distress [General Appearance - Well-Appearing] : healthy appearing [Sclera] : the sclera and conjunctiva were normal [PERRL With Normal Accommodation] : pupils were equal in size, round, reactive to light [Extraocular Movements] : extraocular movements were intact [Outer Ear] : the ears and nose were normal in appearance [Oropharynx] : the oropharynx was normal with no thrush [Neck Appearance] : the appearance of the neck was normal [Neck Cervical Mass (___cm)] : no neck mass was observed [Jugular Venous Distention Increased] : there was no jugular-venous distention [Thyroid Diffuse Enlargement] : the thyroid was not enlarged [Auscultation Breath Sounds / Voice Sounds] : lungs were clear to auscultation bilaterally [Heart Rate And Rhythm] : heart rate was normal and rhythm regular [Heart Sounds Gallop] : no gallops [Heart Sounds] : normal S1 and S2 [Murmurs] : no murmurs [Heart Sounds Pericardial Friction Rub] : no pericardial rub [Edema] : there was no peripheral edema [Bowel Sounds] : normal bowel sounds [Abdomen Soft] : soft [Abdomen Tenderness] : non-tender [Costovertebral Angle Tenderness] : no CVA tenderness [Abdomen Mass (___ Cm)] : no abdominal mass palpated [No Palpable Adenopathy] : no palpable adenopathy [Musculoskeletal - Swelling] : no joint swelling [Nail Clubbing] : no clubbing  or cyanosis of the fingernails [Motor Tone] : muscle strength and tone were normal [Skin Color & Pigmentation] : normal skin color and pigmentation [] : no rash [No Focal Deficits] : no focal deficits [Oriented To Time, Place, And Person] : oriented to person, place, and time [Affect] : the affect was normal

## 2023-08-14 NOTE — HISTORY OF PRESENT ILLNESS
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

## 2023-08-14 NOTE — CONSULT LETTER
[Dear  ___] : Dear  [unfilled], [Consult Letter:] : I had the pleasure of evaluating your patient, [unfilled]. [Please see my note below.] : Please see my note below. [Consult Closing:] : Thank you very much for allowing me to participate in the care of this patient.  If you have any questions, please do not hesitate to contact me. [Sincerely,] : Sincerely, [FreeTextEntry2] : Dr Wily Barkley [address] [place] [address] [FreeTextEntry3] : Matthew Goss MD, FACP, FIDSA, [facility] Infectious Diseases [facility]

## 2023-08-15 DIAGNOSIS — Z11.3 ENCOUNTER FOR SCREENING FOR INFECTIONS WITH A PREDOMINANTLY SEXUAL MODE OF TRANSMISSION: ICD-10-CM

## 2023-08-15 DIAGNOSIS — Z00.00 ENCOUNTER FOR GENERAL ADULT MEDICAL EXAMINATION WITHOUT ABNORMAL FINDINGS: ICD-10-CM

## 2023-08-15 RX ORDER — DOXYCYCLINE HYCLATE 200 MG/1
200 TABLET, DELAYED RELEASE ORAL
Qty: 30 | Refills: 3 | Status: COMPLETED | COMMUNITY
Start: 2023-05-15 | End: 2023-08-15

## 2023-08-15 RX ORDER — DOXYCYCLINE HYCLATE 200 MG/1
200 TABLET, DELAYED RELEASE ORAL
Qty: 20 | Refills: 3 | Status: COMPLETED | COMMUNITY
Start: 2023-05-24 | End: 2023-08-15

## 2023-08-17 RX ORDER — DOXYCYCLINE HYCLATE 100 MG/1
100 CAPSULE ORAL
Qty: 60 | Refills: 1 | Status: ACTIVE | COMMUNITY
Start: 2023-01-27

## 2023-08-18 LAB
ALBUMIN SERPL ELPH-MCNC: 4.5 G/DL
ALP BLD-CCNC: 88 U/L
ALT SERPL-CCNC: 37 U/L
ANION GAP SERPL CALC-SCNC: 16 MMOL/L
AST SERPL-CCNC: 24 U/L
BILIRUB SERPL-MCNC: 0.5 MG/DL
BUN SERPL-MCNC: 15 MG/DL
C TRACH RRNA SPEC QL NAA+PROBE: NOT DETECTED
CALCIUM SERPL-MCNC: 9.7 MG/DL
CHLORIDE SERPL-SCNC: 101 MMOL/L
CHOLEST SERPL-MCNC: 129 MG/DL
CO2 SERPL-SCNC: 24 MMOL/L
CREAT SERPL-MCNC: 0.96 MG/DL
EGFR: 108 ML/MIN/1.73M2
ESTIMATED AVERAGE GLUCOSE: 103 MG/DL
GLUCOSE SERPL-MCNC: 99 MG/DL
HBA1C MFR BLD HPLC: 5.2 %
HDLC SERPL-MCNC: 37 MG/DL
HSV 1 IGG TYPE-SPECIFIC AB: 29.9 INDEX
HSV 2 IGG TYPE-SPECIFIC AB: 1.25 INDEX
LDLC SERPL CALC-MCNC: 64 MG/DL
N GONORRHOEA RRNA SPEC QL NAA+PROBE: NOT DETECTED
NONHDLC SERPL-MCNC: 91 MG/DL
POTASSIUM SERPL-SCNC: 4.6 MMOL/L
PROT SERPL-MCNC: 7.2 G/DL
RPR SER-TITR: NORMAL
SODIUM SERPL-SCNC: 141 MMOL/L
SOURCE AMPLIFICATION: NORMAL
SOURCE ANAL: NORMAL
SOURCE ORAL: NORMAL
TRIGL SERPL-MCNC: 160 MG/DL

## 2023-10-01 PROBLEM — B04: Status: ACTIVE | Noted: 2022-07-26

## 2023-11-17 ENCOUNTER — APPOINTMENT (OUTPATIENT)
Dept: INFECTIOUS DISEASE | Facility: CLINIC | Age: 33
End: 2023-11-17
Payer: MEDICAID

## 2023-11-17 ENCOUNTER — OUTPATIENT (OUTPATIENT)
Dept: OUTPATIENT SERVICES | Facility: HOSPITAL | Age: 33
LOS: 1 days | End: 2023-11-17
Payer: MEDICAID

## 2023-11-17 VITALS
OXYGEN SATURATION: 97 % | DIASTOLIC BLOOD PRESSURE: 72 MMHG | TEMPERATURE: 97.8 F | SYSTOLIC BLOOD PRESSURE: 114 MMHG | HEIGHT: 71 IN | BODY MASS INDEX: 26.6 KG/M2 | WEIGHT: 190 LBS | HEART RATE: 69 BPM

## 2023-11-17 DIAGNOSIS — Z11.3 ENCOUNTER FOR SCREENING FOR INFECTIONS WITH A PREDOMINANTLY SEXUAL MODE OF TRANSMISSION: ICD-10-CM

## 2023-11-17 DIAGNOSIS — Z00.00 ENCOUNTER FOR GENERAL ADULT MEDICAL EXAMINATION WITHOUT ABNORMAL FINDINGS: ICD-10-CM

## 2023-11-17 DIAGNOSIS — Z00.00 ENCOUNTER FOR GENERAL ADULT MEDICAL EXAMINATION W/OUT ABNORMAL FINDINGS: ICD-10-CM

## 2023-11-17 DIAGNOSIS — B97.89 OTHER VIRAL AGENTS AS THE CAUSE OF DISEASES CLASSIFIED ELSEWHERE: ICD-10-CM

## 2023-11-17 PROCEDURE — G0463: CPT

## 2023-11-17 PROCEDURE — 99213 OFFICE O/P EST LOW 20 MIN: CPT

## 2023-11-17 RX ORDER — VALACYCLOVIR 1 G/1
1 TABLET, FILM COATED ORAL
Qty: 60 | Refills: 3 | Status: ACTIVE | COMMUNITY
Start: 2023-01-20 | End: 1900-01-01

## 2023-11-22 LAB
ALBUMIN SERPL ELPH-MCNC: 4.6 G/DL
ALP BLD-CCNC: 69 U/L
ALT SERPL-CCNC: 29 U/L
ANION GAP SERPL CALC-SCNC: 11 MMOL/L
AST SERPL-CCNC: 21 U/L
BILIRUB SERPL-MCNC: 0.6 MG/DL
BUN SERPL-MCNC: 13 MG/DL
C TRACH RRNA SPEC QL NAA+PROBE: NOT DETECTED
CALCIUM SERPL-MCNC: 9.8 MG/DL
CHLORIDE SERPL-SCNC: 103 MMOL/L
CHOLEST SERPL-MCNC: 114 MG/DL
CO2 SERPL-SCNC: 26 MMOL/L
CREAT SERPL-MCNC: 0.98 MG/DL
EGFR: 106 ML/MIN/1.73M2
GLUCOSE SERPL-MCNC: 73 MG/DL
HCT VFR BLD CALC: 48.1 %
HDLC SERPL-MCNC: 36 MG/DL
HGB BLD-MCNC: 15.3 G/DL
HIV1+2 AB SPEC QL IA.RAPID: NONREACTIVE
LDLC SERPL CALC-MCNC: 56 MG/DL
MCHC RBC-ENTMCNC: 28.1 PG
MCHC RBC-ENTMCNC: 31.8 GM/DL
MCV RBC AUTO: 88.3 FL
N GONORRHOEA RRNA SPEC QL NAA+PROBE: NOT DETECTED
NONHDLC SERPL-MCNC: 78 MG/DL
PLATELET # BLD AUTO: 293 K/UL
POTASSIUM SERPL-SCNC: 4.6 MMOL/L
PROT SERPL-MCNC: 6.9 G/DL
RBC # BLD: 5.45 M/UL
RBC # FLD: 12.5 %
RPR SER-TITR: NORMAL
SODIUM SERPL-SCNC: 141 MMOL/L
SOURCE AMPLIFICATION: NORMAL
SOURCE ANAL: NORMAL
SOURCE ORAL: NORMAL
TRIGL SERPL-MCNC: 124 MG/DL
WBC # FLD AUTO: 6.02 K/UL

## 2024-04-01 ENCOUNTER — APPOINTMENT (OUTPATIENT)
Dept: INFECTIOUS DISEASE | Facility: CLINIC | Age: 34
End: 2024-04-01
Payer: COMMERCIAL

## 2024-05-09 ENCOUNTER — APPOINTMENT (OUTPATIENT)
Dept: INFECTIOUS DISEASE | Facility: CLINIC | Age: 34
End: 2024-05-09
Payer: COMMERCIAL

## 2024-05-09 VITALS
HEIGHT: 71 IN | BODY MASS INDEX: 28.42 KG/M2 | WEIGHT: 203 LBS | OXYGEN SATURATION: 98 % | SYSTOLIC BLOOD PRESSURE: 147 MMHG | DIASTOLIC BLOOD PRESSURE: 75 MMHG | TEMPERATURE: 97.7 F | HEART RATE: 67 BPM

## 2024-05-09 DIAGNOSIS — Z00.00 ENCOUNTER FOR GENERAL ADULT MEDICAL EXAMINATION W/OUT ABNORMAL FINDINGS: ICD-10-CM

## 2024-05-09 DIAGNOSIS — Z11.3 ENCOUNTER FOR SCREENING FOR INFECTIONS WITH A PREDOMINANTLY SEXUAL MODE OF TRANSMISSION: ICD-10-CM

## 2024-05-09 PROCEDURE — 99204 OFFICE O/P NEW MOD 45 MIN: CPT

## 2024-05-09 NOTE — PHYSICAL EXAM
[General Appearance - Alert] : alert [General Appearance - In No Acute Distress] : in no acute distress [General Appearance - Well-Appearing] : healthy appearing [Sclera] : the sclera and conjunctiva were normal [PERRL With Normal Accommodation] : pupils were equal in size, round, reactive to light [Extraocular Movements] : extraocular movements were intact [Outer Ear] : the ears and nose were normal in appearance [Oropharynx] : the oropharynx was normal with no thrush [Neck Appearance] : the appearance of the neck was normal [Neck Cervical Mass (___cm)] : no neck mass was observed [Jugular Venous Distention Increased] : there was no jugular-venous distention [Thyroid Diffuse Enlargement] : the thyroid was not enlarged [Auscultation Breath Sounds / Voice Sounds] : lungs were clear to auscultation bilaterally [Heart Rate And Rhythm] : heart rate was normal and rhythm regular [Heart Sounds] : normal S1 and S2 [Heart Sounds Gallop] : no gallops [Murmurs] : no murmurs [Heart Sounds Pericardial Friction Rub] : no pericardial rub [Edema] : there was no peripheral edema [Bowel Sounds] : normal bowel sounds [Abdomen Soft] : soft [Abdomen Tenderness] : non-tender [Abdomen Mass (___ Cm)] : no abdominal mass palpated [Costovertebral Angle Tenderness] : no CVA tenderness [No Palpable Adenopathy] : no palpable adenopathy [Musculoskeletal - Swelling] : no joint swelling [Nail Clubbing] : no clubbing  or cyanosis of the fingernails [Motor Tone] : muscle strength and tone were normal [Skin Color & Pigmentation] : normal skin color and pigmentation [] : no rash [Oriented To Time, Place, And Person] : oriented to person, place, and time [Affect] : the affect was normal

## 2024-05-09 NOTE — CONSULT LETTER
[Dear  ___] : Dear  [unfilled], [Consult Letter:] : I had the pleasure of evaluating your patient, [unfilled]. [Please see my note below.] : Please see my note below. [Consult Closing:] : Thank you very much for allowing me to participate in the care of this patient.  If you have any questions, please do not hesitate to contact me. [Sincerely,] : Sincerely, [FreeTextEntry2] : Dr Wily Barkley [address] [place] [address] [FreeTextEntry3] : Matthew Goss MD, FACP, FIDSA, [facility] Infectious Diseases [facility]

## 2024-05-09 NOTE — ASSESSMENT
[FreeTextEntry1] : appears well doing well on HIV PREP with Truvada and Doxy PEP He is still recovering from cosmetic surgery 1/22/24  labs today [Medical Care Issues] : medical care issues

## 2024-05-09 NOTE — HISTORY OF PRESENT ILLNESS
[FreeTextEntry1] : Doing well No fever, chills,  no dysuria, urethral discharge, rectal pain, sore throat, rash, joint pain TRUVADA for prep since 7/14/15 well tolerated Takes DOXY pep  - doxycycline 100 mg x2 within 24 - 72 hours after exposures of concern  On 1/22/24 underwent cosmetic surgery, abdominoplasty, liposuction and left of chest  -- complicated by dehiscence of right axillary incision with continued drainage  - healing by secondary intention. He has not been able to exercise, Less often sexually active. He has gained 13# since 11/17/23  - current weight = 203#  BMI = 28.31

## 2024-05-10 LAB
ALBUMIN SERPL ELPH-MCNC: 4.8 G/DL
ALP BLD-CCNC: 85 U/L
ALT SERPL-CCNC: 29 U/L
ANION GAP SERPL CALC-SCNC: 13 MMOL/L
AST SERPL-CCNC: 21 U/L
BILIRUB SERPL-MCNC: 0.4 MG/DL
BUN SERPL-MCNC: 10 MG/DL
CALCIUM SERPL-MCNC: 10 MG/DL
CHLORIDE SERPL-SCNC: 100 MMOL/L
CHOLEST SERPL-MCNC: 137 MG/DL
CO2 SERPL-SCNC: 27 MMOL/L
CREAT SERPL-MCNC: 0.89 MG/DL
EGFR: 117 ML/MIN/1.73M2
ESTIMATED AVERAGE GLUCOSE: 108 MG/DL
GLUCOSE SERPL-MCNC: 89 MG/DL
HBA1C MFR BLD HPLC: 5.4 %
HCT VFR BLD CALC: 46.4 %
HDLC SERPL-MCNC: 41 MG/DL
HGB BLD-MCNC: 15.1 G/DL
HIV1+2 AB SPEC QL IA.RAPID: NONREACTIVE
LDLC SERPL CALC-MCNC: 84 MG/DL
MCHC RBC-ENTMCNC: 25.5 PG
MCHC RBC-ENTMCNC: 32.5 GM/DL
MCV RBC AUTO: 78.4 FL
NONHDLC SERPL-MCNC: 96 MG/DL
PLATELET # BLD AUTO: 253 K/UL
POTASSIUM SERPL-SCNC: 4.4 MMOL/L
PROT SERPL-MCNC: 7.9 G/DL
RBC # BLD: 5.92 M/UL
RBC # FLD: 15.2 %
SODIUM SERPL-SCNC: 141 MMOL/L
TRIGL SERPL-MCNC: 58 MG/DL
WBC # FLD AUTO: 9.36 K/UL

## 2024-05-14 ENCOUNTER — RX RENEWAL (OUTPATIENT)
Age: 34
End: 2024-05-14

## 2024-05-14 LAB
C TRACH RRNA SPEC QL NAA+PROBE: NOT DETECTED
N GONORRHOEA RRNA SPEC QL NAA+PROBE: NOT DETECTED
RPR SER-TITR: NORMAL
SOURCE AMPLIFICATION: NORMAL
SOURCE ANAL: NORMAL
SOURCE ORAL: NORMAL

## 2024-07-03 ENCOUNTER — LABORATORY RESULT (OUTPATIENT)
Age: 34
End: 2024-07-03

## 2024-07-03 ENCOUNTER — APPOINTMENT (OUTPATIENT)
Dept: INFECTIOUS DISEASE | Facility: CLINIC | Age: 34
End: 2024-07-03

## 2024-07-03 DIAGNOSIS — Z20.2 CONTACT WITH AND (SUSPECTED) EXPOSURE TO INFECTIONS WITH A PREDOMINANTLY SEXUAL MODE OF TRANSMISSION: ICD-10-CM

## 2024-07-03 RX ORDER — DOXYCYCLINE HYCLATE 100 MG/1
100 TABLET ORAL
Qty: 14 | Refills: 0 | Status: ACTIVE | COMMUNITY
Start: 2024-07-03 | End: 1900-01-01

## 2024-07-05 LAB — T PALLIDUM AB SER QL IA: POSITIVE

## 2024-07-08 ENCOUNTER — EMERGENCY (EMERGENCY)
Facility: HOSPITAL | Age: 34
LOS: 1 days | Discharge: HOME | End: 2024-07-08
Attending: EMERGENCY MEDICINE
Payer: COMMERCIAL

## 2024-07-08 ENCOUNTER — NON-APPOINTMENT (OUTPATIENT)
Age: 34
End: 2024-07-08

## 2024-07-08 VITALS
WEIGHT: 199.96 LBS | RESPIRATION RATE: 20 BRPM | HEART RATE: 73 BPM | OXYGEN SATURATION: 96 % | HEIGHT: 70.5 IN | SYSTOLIC BLOOD PRESSURE: 117 MMHG | DIASTOLIC BLOOD PRESSURE: 73 MMHG | TEMPERATURE: 98 F

## 2024-07-08 VITALS
RESPIRATION RATE: 18 BRPM | HEART RATE: 78 BPM | TEMPERATURE: 98 F | OXYGEN SATURATION: 98 % | DIASTOLIC BLOOD PRESSURE: 70 MMHG | SYSTOLIC BLOOD PRESSURE: 120 MMHG

## 2024-07-08 DIAGNOSIS — A54.9 GONOCOCCAL INFECTION, UNSPECIFIED: ICD-10-CM

## 2024-07-08 LAB
ALBUMIN SERPL ELPH-MCNC: 4.4 G/DL — SIGNIFICANT CHANGE UP (ref 3.3–5)
ALP SERPL-CCNC: 76 U/L — SIGNIFICANT CHANGE UP (ref 40–120)
ALT FLD-CCNC: 27 U/L — SIGNIFICANT CHANGE UP (ref 10–45)
ANION GAP SERPL CALC-SCNC: 12 MMOL/L — SIGNIFICANT CHANGE UP (ref 5–17)
APPEARANCE UR: ABNORMAL
AST SERPL-CCNC: 19 U/L — SIGNIFICANT CHANGE UP (ref 10–40)
BACTERIA # UR AUTO: NEGATIVE /HPF — SIGNIFICANT CHANGE UP
BASOPHILS # BLD AUTO: 0.05 K/UL — SIGNIFICANT CHANGE UP (ref 0–0.2)
BASOPHILS NFR BLD AUTO: 0.6 % — SIGNIFICANT CHANGE UP (ref 0–2)
BILIRUB SERPL-MCNC: 0.8 MG/DL — SIGNIFICANT CHANGE UP (ref 0.2–1.2)
BILIRUB UR-MCNC: NEGATIVE — SIGNIFICANT CHANGE UP
BUN SERPL-MCNC: 14 MG/DL — SIGNIFICANT CHANGE UP (ref 7–23)
C TRACH RRNA SPEC QL NAA+PROBE: NOT DETECTED
CALCIUM SERPL-MCNC: 9.5 MG/DL — SIGNIFICANT CHANGE UP (ref 8.4–10.5)
CAST: 0 /LPF — SIGNIFICANT CHANGE UP (ref 0–4)
CHLORIDE SERPL-SCNC: 102 MMOL/L — SIGNIFICANT CHANGE UP (ref 96–108)
CO2 SERPL-SCNC: 24 MMOL/L — SIGNIFICANT CHANGE UP (ref 22–31)
COLOR SPEC: YELLOW — SIGNIFICANT CHANGE UP
CREAT SERPL-MCNC: 0.97 MG/DL — SIGNIFICANT CHANGE UP (ref 0.5–1.3)
DIFF PNL FLD: ABNORMAL
EGFR: 106 ML/MIN/1.73M2 — SIGNIFICANT CHANGE UP
EOSINOPHIL # BLD AUTO: 0.07 K/UL — SIGNIFICANT CHANGE UP (ref 0–0.5)
EOSINOPHIL NFR BLD AUTO: 0.9 % — SIGNIFICANT CHANGE UP (ref 0–6)
GLUCOSE SERPL-MCNC: 78 MG/DL — SIGNIFICANT CHANGE UP (ref 70–99)
GLUCOSE UR QL: NEGATIVE MG/DL — SIGNIFICANT CHANGE UP
HCT VFR BLD CALC: 45.7 % — SIGNIFICANT CHANGE UP (ref 39–50)
HGB BLD-MCNC: 14.5 G/DL — SIGNIFICANT CHANGE UP (ref 13–17)
IMM GRANULOCYTES NFR BLD AUTO: 0.4 % — SIGNIFICANT CHANGE UP (ref 0–0.9)
KETONES UR-MCNC: NEGATIVE MG/DL — SIGNIFICANT CHANGE UP
LEUKOCYTE ESTERASE UR-ACNC: ABNORMAL
LYMPHOCYTES # BLD AUTO: 1.96 K/UL — SIGNIFICANT CHANGE UP (ref 1–3.3)
LYMPHOCYTES # BLD AUTO: 25 % — SIGNIFICANT CHANGE UP (ref 13–44)
MCHC RBC-ENTMCNC: 25.9 PG — LOW (ref 27–34)
MCHC RBC-ENTMCNC: 31.7 GM/DL — LOW (ref 32–36)
MCV RBC AUTO: 81.8 FL — SIGNIFICANT CHANGE UP (ref 80–100)
MONOCYTES # BLD AUTO: 0.83 K/UL — SIGNIFICANT CHANGE UP (ref 0–0.9)
MONOCYTES NFR BLD AUTO: 10.6 % — SIGNIFICANT CHANGE UP (ref 2–14)
N GONORRHOEA RRNA SPEC QL NAA+PROBE: DETECTED
N GONORRHOEA RRNA SPEC QL NAA+PROBE: DETECTED
N GONORRHOEA RRNA SPEC QL NAA+PROBE: NOT DETECTED
NEUTROPHILS # BLD AUTO: 4.9 K/UL — SIGNIFICANT CHANGE UP (ref 1.8–7.4)
NEUTROPHILS NFR BLD AUTO: 62.5 % — SIGNIFICANT CHANGE UP (ref 43–77)
NITRITE UR-MCNC: NEGATIVE — SIGNIFICANT CHANGE UP
NRBC # BLD: 0 /100 WBCS — SIGNIFICANT CHANGE UP (ref 0–0)
PH UR: 6.5 — SIGNIFICANT CHANGE UP (ref 5–8)
PLATELET # BLD AUTO: 289 K/UL — SIGNIFICANT CHANGE UP (ref 150–400)
POTASSIUM SERPL-MCNC: 4.1 MMOL/L — SIGNIFICANT CHANGE UP (ref 3.5–5.3)
POTASSIUM SERPL-SCNC: 4.1 MMOL/L — SIGNIFICANT CHANGE UP (ref 3.5–5.3)
PROT SERPL-MCNC: 7.5 G/DL — SIGNIFICANT CHANGE UP (ref 6–8.3)
PROT UR-MCNC: NEGATIVE MG/DL — SIGNIFICANT CHANGE UP
RBC # BLD: 5.59 M/UL — SIGNIFICANT CHANGE UP (ref 4.2–5.8)
RBC # FLD: 14.8 % — HIGH (ref 10.3–14.5)
RBC CASTS # UR COMP ASSIST: 0 /HPF — SIGNIFICANT CHANGE UP (ref 0–4)
SODIUM SERPL-SCNC: 138 MMOL/L — SIGNIFICANT CHANGE UP (ref 135–145)
SOURCE AMPLIFICATION: NORMAL
SOURCE ANAL: NORMAL
SOURCE ORAL: NORMAL
SP GR SPEC: 1 — SIGNIFICANT CHANGE UP (ref 1–1.03)
SQUAMOUS # UR AUTO: 0 /HPF — SIGNIFICANT CHANGE UP (ref 0–5)
UROBILINOGEN FLD QL: 0.2 MG/DL — SIGNIFICANT CHANGE UP (ref 0.2–1)
WBC # BLD: 7.84 K/UL — SIGNIFICANT CHANGE UP (ref 3.8–10.5)
WBC # FLD AUTO: 7.84 K/UL — SIGNIFICANT CHANGE UP (ref 3.8–10.5)
WBC UR QL: 152 /HPF — HIGH (ref 0–5)

## 2024-07-08 PROCEDURE — 87591 N.GONORRHOEAE DNA AMP PROB: CPT

## 2024-07-08 PROCEDURE — 80053 COMPREHEN METABOLIC PANEL: CPT

## 2024-07-08 PROCEDURE — 96372 THER/PROPH/DIAG INJ SC/IM: CPT

## 2024-07-08 PROCEDURE — 99285 EMERGENCY DEPT VISIT HI MDM: CPT

## 2024-07-08 PROCEDURE — 87491 CHLMYD TRACH DNA AMP PROBE: CPT

## 2024-07-08 PROCEDURE — 81001 URINALYSIS AUTO W/SCOPE: CPT

## 2024-07-08 PROCEDURE — 99283 EMERGENCY DEPT VISIT LOW MDM: CPT | Mod: 25

## 2024-07-08 PROCEDURE — 87086 URINE CULTURE/COLONY COUNT: CPT

## 2024-07-08 PROCEDURE — 85025 COMPLETE CBC W/AUTO DIFF WBC: CPT

## 2024-07-08 RX ORDER — GENTAMICIN SULFATE 40 MG/ML
240 VIAL (ML) INJECTION ONCE
Refills: 0 | Status: COMPLETED | OUTPATIENT
Start: 2024-07-08 | End: 2024-07-08

## 2024-07-08 RX ORDER — AZITHROMYCIN 250 MG/1
2000 TABLET, FILM COATED ORAL ONCE
Refills: 0 | Status: COMPLETED | OUTPATIENT
Start: 2024-07-08 | End: 2024-07-08

## 2024-07-08 RX ADMIN — Medication 240 MILLIGRAM(S): at 16:51

## 2024-07-08 RX ADMIN — AZITHROMYCIN 2000 MILLIGRAM(S): 250 TABLET, FILM COATED ORAL at 16:50

## 2024-07-09 ENCOUNTER — NON-APPOINTMENT (OUTPATIENT)
Age: 34
End: 2024-07-09

## 2024-07-09 DIAGNOSIS — Z11.3 ENCOUNTER FOR SCREENING FOR INFECTIONS WITH A PREDOMINANTLY SEXUAL MODE OF TRANSMISSION: ICD-10-CM

## 2024-07-09 LAB
C TRACH RRNA SPEC QL NAA+PROBE: SIGNIFICANT CHANGE UP
CULTURE RESULTS: NO GROWTH — SIGNIFICANT CHANGE UP
N GONORRHOEA RRNA SPEC QL NAA+PROBE: DETECTED
SPECIMEN SOURCE: SIGNIFICANT CHANGE UP
SPECIMEN SOURCE: SIGNIFICANT CHANGE UP

## 2024-07-10 ENCOUNTER — NON-APPOINTMENT (OUTPATIENT)
Age: 34
End: 2024-07-10

## 2024-07-22 ENCOUNTER — LABORATORY RESULT (OUTPATIENT)
Age: 34
End: 2024-07-22

## 2024-07-22 ENCOUNTER — APPOINTMENT (OUTPATIENT)
Dept: INFECTIOUS DISEASE | Facility: CLINIC | Age: 34
End: 2024-07-22

## 2024-07-23 LAB
ALBUMIN SERPL ELPH-MCNC: 4.6 G/DL
ALP BLD-CCNC: 70 U/L
ANION GAP SERPL CALC-SCNC: 18 MMOL/L
AST SERPL-CCNC: 27 U/L
BILIRUB SERPL-MCNC: 0.7 MG/DL
BUN SERPL-MCNC: 14 MG/DL
C TRACH RRNA SPEC QL NAA+PROBE: NOT DETECTED
CALCIUM SERPL-MCNC: 9.7 MG/DL
CO2 SERPL-SCNC: 24 MMOL/L
EGFR: 108 ML/MIN/1.73M2
GLUCOSE SERPL-MCNC: 62 MG/DL
HCT VFR BLD CALC: 45.8 %
HGB BLD-MCNC: 14.8 G/DL
HIV1+2 AB SPEC QL IA.RAPID: NONREACTIVE
MCHC RBC-ENTMCNC: 27.2 PG
MCHC RBC-ENTMCNC: 32.3 GM/DL
MCV RBC AUTO: 84.2 FL
N GONORRHOEA RRNA SPEC QL NAA+PROBE: NOT DETECTED
PLATELET # BLD AUTO: 244 K/UL
POTASSIUM SERPL-SCNC: 4.4 MMOL/L
PROT SERPL-MCNC: 7.3 G/DL
RBC # BLD: 5.44 M/UL
RBC # FLD: 14.8 %
SODIUM SERPL-SCNC: 142 MMOL/L
SOURCE AMPLIFICATION: NORMAL
SOURCE ANAL: NORMAL
SOURCE ORAL: NORMAL
WBC # FLD AUTO: 7.34 K/UL

## 2024-08-12 ENCOUNTER — APPOINTMENT (OUTPATIENT)
Dept: INFECTIOUS DISEASE | Facility: CLINIC | Age: 34
End: 2024-08-12

## 2024-08-30 NOTE — ASSESSMENT
[Medical Care Issues] : medical care issues [FreeTextEntry1] : possible monkey pox\par   - he is not immunocompromised, critical anatomic area not involved at present\par \par swabs taken\par rvp for covid done\par will follow up by phone  ( i tole him that i will be out 7/29 and weekend) show Stable.

## 2024-11-04 ENCOUNTER — LABORATORY RESULT (OUTPATIENT)
Age: 34
End: 2024-11-04

## 2024-11-04 ENCOUNTER — APPOINTMENT (OUTPATIENT)
Dept: INFECTIOUS DISEASE | Facility: CLINIC | Age: 34
End: 2024-11-04
Payer: COMMERCIAL

## 2024-11-04 VITALS
HEART RATE: 78 BPM | OXYGEN SATURATION: 97 % | SYSTOLIC BLOOD PRESSURE: 134 MMHG | DIASTOLIC BLOOD PRESSURE: 80 MMHG | WEIGHT: 220 LBS | HEIGHT: 71 IN | BODY MASS INDEX: 30.8 KG/M2 | TEMPERATURE: 97.8 F

## 2024-11-04 DIAGNOSIS — Z11.3 ENCOUNTER FOR SCREENING FOR INFECTIONS WITH A PREDOMINANTLY SEXUAL MODE OF TRANSMISSION: ICD-10-CM

## 2024-11-04 PROCEDURE — 99213 OFFICE O/P EST LOW 20 MIN: CPT

## 2024-11-05 LAB
ALBUMIN SERPL ELPH-MCNC: 4.7 G/DL
ALP BLD-CCNC: 69 U/L
ALT SERPL-CCNC: 33 U/L
ANION GAP SERPL CALC-SCNC: 14 MMOL/L
AST SERPL-CCNC: 20 U/L
BILIRUB SERPL-MCNC: 1 MG/DL
BUN SERPL-MCNC: 12 MG/DL
C TRACH RRNA SPEC QL NAA+PROBE: NOT DETECTED
CALCIUM SERPL-MCNC: 9.9 MG/DL
CHLORIDE SERPL-SCNC: 100 MMOL/L
CO2 SERPL-SCNC: 26 MMOL/L
CREAT SERPL-MCNC: 0.99 MG/DL
EGFR: 104 ML/MIN/1.73M2
ESTIMATED AVERAGE GLUCOSE: 103 MG/DL
GLUCOSE SERPL-MCNC: 88 MG/DL
HBA1C MFR BLD HPLC: 5.2 %
HCT VFR BLD CALC: 48.8 %
HGB BLD-MCNC: 16 G/DL
HIV1+2 AB SPEC QL IA.RAPID: NONREACTIVE
MCHC RBC-ENTMCNC: 27.6 PG
MCHC RBC-ENTMCNC: 32.8 G/DL
MCV RBC AUTO: 84.1 FL
N GONORRHOEA RRNA SPEC QL NAA+PROBE: NOT DETECTED
PLATELET # BLD AUTO: 277 K/UL
POTASSIUM SERPL-SCNC: 4.4 MMOL/L
PROT SERPL-MCNC: 7.4 G/DL
RBC # BLD: 5.8 M/UL
RBC # FLD: 13 %
SODIUM SERPL-SCNC: 140 MMOL/L
SOURCE AMPLIFICATION: NORMAL
SOURCE ANAL: NORMAL
SOURCE ORAL: NORMAL
WBC # FLD AUTO: 9.63 K/UL

## 2024-11-06 LAB — T PALLIDUM AB SER QL IA: POSITIVE

## 2024-11-08 ENCOUNTER — NON-APPOINTMENT (OUTPATIENT)
Age: 34
End: 2024-11-08

## 2024-11-26 ENCOUNTER — RX RENEWAL (OUTPATIENT)
Age: 34
End: 2024-11-26

## 2025-01-13 ENCOUNTER — LABORATORY RESULT (OUTPATIENT)
Age: 35
End: 2025-01-13

## 2025-01-13 ENCOUNTER — APPOINTMENT (OUTPATIENT)
Dept: INFECTIOUS DISEASE | Facility: CLINIC | Age: 35
End: 2025-01-13

## 2025-01-13 DIAGNOSIS — Z20.2 CONTACT WITH AND (SUSPECTED) EXPOSURE TO INFECTIONS WITH A PREDOMINANTLY SEXUAL MODE OF TRANSMISSION: ICD-10-CM

## 2025-01-14 LAB
APPEARANCE: CLEAR
BACTERIA: NEGATIVE /HPF
BILIRUBIN URINE: NEGATIVE
BLOOD URINE: NEGATIVE
C TRACH RRNA SPEC QL NAA+PROBE: NOT DETECTED
C TRACH RRNA SPEC QL NAA+PROBE: NOT DETECTED
CAST: 0 /LPF
COLOR: YELLOW
EPITHELIAL CELLS: 0 /HPF
GLUCOSE QUALITATIVE U: NEGATIVE MG/DL
KETONES URINE: NEGATIVE MG/DL
LEUKOCYTE ESTERASE URINE: NEGATIVE
MICROSCOPIC-UA: NORMAL
N GONORRHOEA RRNA SPEC QL NAA+PROBE: NOT DETECTED
N GONORRHOEA RRNA SPEC QL NAA+PROBE: NOT DETECTED
NITRITE URINE: NEGATIVE
PH URINE: 7.5
PROTEIN URINE: NEGATIVE MG/DL
RED BLOOD CELLS URINE: 0 /HPF
SOURCE ANAL: NORMAL
SOURCE ORAL: NORMAL
SPECIFIC GRAVITY URINE: 1.01
UROBILINOGEN URINE: 0.2 MG/DL
WHITE BLOOD CELLS URINE: 0 /HPF

## 2025-01-15 LAB — T PALLIDUM AB SER QL IA: POSITIVE

## 2025-01-28 ENCOUNTER — RX RENEWAL (OUTPATIENT)
Age: 35
End: 2025-01-28

## 2025-02-10 ENCOUNTER — APPOINTMENT (OUTPATIENT)
Dept: INFECTIOUS DISEASE | Facility: CLINIC | Age: 35
End: 2025-02-10

## 2025-02-13 ENCOUNTER — NON-APPOINTMENT (OUTPATIENT)
Age: 35
End: 2025-02-13

## 2025-02-13 DIAGNOSIS — Z20.2 CONTACT WITH AND (SUSPECTED) EXPOSURE TO INFECTIONS WITH A PREDOMINANTLY SEXUAL MODE OF TRANSMISSION: ICD-10-CM

## 2025-02-14 ENCOUNTER — LABORATORY RESULT (OUTPATIENT)
Age: 35
End: 2025-02-14

## 2025-02-14 ENCOUNTER — APPOINTMENT (OUTPATIENT)
Dept: INFECTIOUS DISEASE | Facility: CLINIC | Age: 35
End: 2025-02-14

## 2025-02-14 DIAGNOSIS — A54.9 GONOCOCCAL INFECTION, UNSPECIFIED: ICD-10-CM

## 2025-02-14 PROCEDURE — 96372 THER/PROPH/DIAG INJ SC/IM: CPT

## 2025-02-14 RX ORDER — AZITHROMYCIN 500 MG/1
500 TABLET, FILM COATED ORAL
Qty: 4 | Refills: 0 | Status: ACTIVE | OUTPATIENT
Start: 2025-02-13

## 2025-02-14 RX ORDER — GENTAMICIN SULFATE 40 MG/ML
40 INJECTION, SOLUTION INTRAMUSCULAR; INTRAVENOUS
Qty: 1 | Refills: 0 | Status: COMPLETED | OUTPATIENT
Start: 2025-02-14

## 2025-02-18 LAB
C TRACH RRNA SPEC QL NAA+PROBE: NOT DETECTED
HIV1+2 AB SPEC QL IA.RAPID: NONREACTIVE
N GONORRHOEA RRNA SPEC QL NAA+PROBE: DETECTED
N GONORRHOEA RRNA SPEC QL NAA+PROBE: DETECTED
N GONORRHOEA RRNA SPEC QL NAA+PROBE: NOT DETECTED
SOURCE AMPLIFICATION: NORMAL
SOURCE ANAL: NORMAL
SOURCE ORAL: NORMAL
T PALLIDUM AB SER QL IA: POSITIVE

## 2025-03-05 DIAGNOSIS — Z11.3 ENCOUNTER FOR SCREENING FOR INFECTIONS WITH A PREDOMINANTLY SEXUAL MODE OF TRANSMISSION: ICD-10-CM

## 2025-03-07 ENCOUNTER — APPOINTMENT (OUTPATIENT)
Dept: INFECTIOUS DISEASE | Facility: CLINIC | Age: 35
End: 2025-03-07

## 2025-03-10 LAB
C TRACH RRNA SPEC QL NAA+PROBE: NOT DETECTED
N GONORRHOEA RRNA SPEC QL NAA+PROBE: NOT DETECTED
SOURCE AMPLIFICATION: NORMAL
SOURCE ANAL: NORMAL
SOURCE ORAL: NORMAL

## 2025-04-22 DIAGNOSIS — Z11.3 ENCOUNTER FOR SCREENING FOR INFECTIONS WITH A PREDOMINANTLY SEXUAL MODE OF TRANSMISSION: ICD-10-CM

## 2025-04-23 ENCOUNTER — LABORATORY RESULT (OUTPATIENT)
Age: 35
End: 2025-04-23

## 2025-04-23 ENCOUNTER — APPOINTMENT (OUTPATIENT)
Dept: INFECTIOUS DISEASE | Facility: CLINIC | Age: 35
End: 2025-04-23

## 2025-04-24 LAB
C TRACH RRNA SPEC QL NAA+PROBE: NOT DETECTED
C TRACH RRNA SPEC QL NAA+PROBE: NOT DETECTED
HIV1+2 AB SPEC QL IA.RAPID: NONREACTIVE
N GONORRHOEA RRNA SPEC QL NAA+PROBE: NOT DETECTED
N GONORRHOEA RRNA SPEC QL NAA+PROBE: NOT DETECTED
SOURCE AMPLIFICATION: NORMAL
SOURCE ANAL: NORMAL
T PALLIDUM AB SER QL IA: POSITIVE

## 2025-04-28 ENCOUNTER — RX RENEWAL (OUTPATIENT)
Age: 35
End: 2025-04-28

## 2025-08-28 ENCOUNTER — RX RENEWAL (OUTPATIENT)
Age: 35
End: 2025-08-28